# Patient Record
Sex: FEMALE | Race: WHITE | NOT HISPANIC OR LATINO | Employment: STUDENT | URBAN - METROPOLITAN AREA
[De-identification: names, ages, dates, MRNs, and addresses within clinical notes are randomized per-mention and may not be internally consistent; named-entity substitution may affect disease eponyms.]

---

## 2019-02-26 ENCOUNTER — COMMUNICATION - HEALTHEAST (OUTPATIENT)
Dept: TELEHEALTH | Facility: CLINIC | Age: 24
End: 2019-02-26

## 2019-02-26 ENCOUNTER — OFFICE VISIT - HEALTHEAST (OUTPATIENT)
Dept: FAMILY MEDICINE | Facility: CLINIC | Age: 24
End: 2019-02-26

## 2019-02-26 DIAGNOSIS — T78.40XA ALLERGIC REACTION, INITIAL ENCOUNTER: ICD-10-CM

## 2019-03-06 ENCOUNTER — OFFICE VISIT - HEALTHEAST (OUTPATIENT)
Dept: ALLERGY | Facility: CLINIC | Age: 24
End: 2019-03-06

## 2019-03-06 DIAGNOSIS — L50.8 CHRONIC URTICARIA: ICD-10-CM

## 2019-03-06 LAB
BASOPHILS # BLD AUTO: 0 THOU/UL (ref 0–0.2)
BASOPHILS NFR BLD AUTO: 0 % (ref 0–2)
EOSINOPHIL # BLD AUTO: 0.3 THOU/UL (ref 0–0.4)
EOSINOPHIL NFR BLD AUTO: 6 % (ref 0–6)
ERYTHROCYTE [DISTWIDTH] IN BLOOD BY AUTOMATED COUNT: 11.9 % (ref 11–14.5)
ERYTHROCYTE [SEDIMENTATION RATE] IN BLOOD BY WESTERGREN METHOD: 4 MM/HR (ref 0–20)
HCT VFR BLD AUTO: 41.8 % (ref 35–47)
HGB BLD-MCNC: 13.9 G/DL (ref 12–16)
LYMPHOCYTES # BLD AUTO: 1.7 THOU/UL (ref 0.8–4.4)
LYMPHOCYTES NFR BLD AUTO: 35 % (ref 20–40)
MCH RBC QN AUTO: 30.5 PG (ref 27–34)
MCHC RBC AUTO-ENTMCNC: 33.3 G/DL (ref 32–36)
MCV RBC AUTO: 92 FL (ref 80–100)
MONOCYTES # BLD AUTO: 0.4 THOU/UL (ref 0–0.9)
MONOCYTES NFR BLD AUTO: 7 % (ref 2–10)
NEUTROPHILS # BLD AUTO: 2.6 THOU/UL (ref 2–7.7)
NEUTROPHILS NFR BLD AUTO: 51 % (ref 50–70)
PLATELET # BLD AUTO: 401 THOU/UL (ref 140–440)
PMV BLD AUTO: 6.9 FL (ref 7–10)
RBC # BLD AUTO: 4.56 MILL/UL (ref 3.8–5.4)
TSH SERPL DL<=0.005 MIU/L-ACNC: 1.05 UIU/ML (ref 0.3–5)
WBC: 5 THOU/UL (ref 4–11)

## 2019-03-06 ASSESSMENT — MIFFLIN-ST. JEOR: SCORE: 1741.53

## 2019-03-07 LAB — ANA SER QL: 2.8 U

## 2019-03-11 ENCOUNTER — OFFICE VISIT - HEALTHEAST (OUTPATIENT)
Dept: INTERNAL MEDICINE | Facility: CLINIC | Age: 24
End: 2019-03-11

## 2019-03-11 DIAGNOSIS — J30.1 ALLERGIC RHINITIS DUE TO POLLEN, UNSPECIFIED SEASONALITY: ICD-10-CM

## 2019-03-11 DIAGNOSIS — G43.109 MIGRAINE WITH AURA AND WITHOUT STATUS MIGRAINOSUS, NOT INTRACTABLE: ICD-10-CM

## 2019-03-11 DIAGNOSIS — F41.1 GENERALIZED ANXIETY DISORDER: ICD-10-CM

## 2019-03-11 DIAGNOSIS — Z00.00 ENCOUNTER FOR PREVENTIVE HEALTH EXAMINATION: ICD-10-CM

## 2019-03-11 DIAGNOSIS — F33.1 MODERATE EPISODE OF RECURRENT MAJOR DEPRESSIVE DISORDER (H): ICD-10-CM

## 2019-03-11 DIAGNOSIS — M25.542 ARTHRALGIA OF HANDS, BILATERAL: ICD-10-CM

## 2019-03-11 DIAGNOSIS — M25.541 ARTHRALGIA OF HANDS, BILATERAL: ICD-10-CM

## 2019-03-11 LAB
ALBUMIN SERPL-MCNC: 4 G/DL (ref 3.5–5)
ALP SERPL-CCNC: 64 U/L (ref 45–120)
ALT SERPL W P-5'-P-CCNC: <9 U/L (ref 0–45)
ANION GAP SERPL CALCULATED.3IONS-SCNC: 7 MMOL/L (ref 5–18)
AST SERPL W P-5'-P-CCNC: 14 U/L (ref 0–40)
BILIRUB SERPL-MCNC: 0.6 MG/DL (ref 0–1)
BUN SERPL-MCNC: 10 MG/DL (ref 8–22)
C REACTIVE PROTEIN LHE: 0.2 MG/DL (ref 0–0.8)
CALCIUM SERPL-MCNC: 9.9 MG/DL (ref 8.5–10.5)
CHLORIDE BLD-SCNC: 108 MMOL/L (ref 98–107)
CO2 SERPL-SCNC: 26 MMOL/L (ref 22–31)
CREAT SERPL-MCNC: 0.69 MG/DL (ref 0.6–1.1)
GFR SERPL CREATININE-BSD FRML MDRD: >60 ML/MIN/1.73M2
GLUCOSE BLD-MCNC: 71 MG/DL (ref 70–125)
POTASSIUM BLD-SCNC: 4.4 MMOL/L (ref 3.5–5)
PROT SERPL-MCNC: 7.3 G/DL (ref 6–8)
SODIUM SERPL-SCNC: 141 MMOL/L (ref 136–145)
TRYPTASE SERPL-MCNC: 3.2 UG/L

## 2019-03-11 ASSESSMENT — MIFFLIN-ST. JEOR: SCORE: 1762.85

## 2019-03-12 ENCOUNTER — COMMUNICATION - HEALTHEAST (OUTPATIENT)
Dept: INTERNAL MEDICINE | Facility: CLINIC | Age: 24
End: 2019-03-12

## 2019-03-13 ENCOUNTER — COMMUNICATION - HEALTHEAST (OUTPATIENT)
Dept: ALLERGY | Facility: CLINIC | Age: 24
End: 2019-03-13

## 2019-03-15 ENCOUNTER — OFFICE VISIT - HEALTHEAST (OUTPATIENT)
Dept: MIDWIFE SERVICES | Facility: CLINIC | Age: 24
End: 2019-03-15

## 2019-03-15 ENCOUNTER — TRANSFERRED RECORDS (OUTPATIENT)
Dept: HEALTH INFORMATION MANAGEMENT | Facility: CLINIC | Age: 24
End: 2019-03-15

## 2019-03-15 DIAGNOSIS — Z30.46 NEXPLANON REMOVAL: ICD-10-CM

## 2019-03-15 ASSESSMENT — MIFFLIN-ST. JEOR: SCORE: 1762.4

## 2019-03-22 ENCOUNTER — RECORDS - HEALTHEAST (OUTPATIENT)
Dept: ADMINISTRATIVE | Facility: OTHER | Age: 24
End: 2019-03-22

## 2019-03-22 ENCOUNTER — HOSPITAL ENCOUNTER (INPATIENT)
Facility: CLINIC | Age: 24
LOS: 1 days | Discharge: HOME | End: 2019-03-23
Attending: PSYCHIATRY & NEUROLOGY | Admitting: PSYCHIATRY & NEUROLOGY
Payer: COMMERCIAL

## 2019-03-22 DIAGNOSIS — F31.64 BIPOLAR AFFECTIVE DISORDER, MIXED, SEVERE, WITH PSYCHOTIC BEHAVIOR (H): ICD-10-CM

## 2019-03-22 DIAGNOSIS — F12.10 MARIJUANA ABUSE: ICD-10-CM

## 2019-03-22 LAB
AMPHETAMINES UR QL SCN: NEGATIVE
BARBITURATES UR QL: NEGATIVE
BENZODIAZ UR QL: NEGATIVE
CANNABINOIDS UR QL SCN: POSITIVE
COCAINE UR QL: NEGATIVE
ETHANOL UR QL SCN: NEGATIVE
HCG UR QL: NEGATIVE
OPIATES UR QL SCN: NEGATIVE

## 2019-03-22 PROCEDURE — 81025 URINE PREGNANCY TEST: CPT | Performed by: EMERGENCY MEDICINE

## 2019-03-22 PROCEDURE — 90791 PSYCH DIAGNOSTIC EVALUATION: CPT

## 2019-03-22 PROCEDURE — 80320 DRUG SCREEN QUANTALCOHOLS: CPT | Performed by: EMERGENCY MEDICINE

## 2019-03-22 PROCEDURE — 99285 EMERGENCY DEPT VISIT HI MDM: CPT | Mod: Z6 | Performed by: PSYCHIATRY & NEUROLOGY

## 2019-03-22 PROCEDURE — 25000132 ZZH RX MED GY IP 250 OP 250 PS 637: Performed by: PSYCHIATRY & NEUROLOGY

## 2019-03-22 PROCEDURE — 99285 EMERGENCY DEPT VISIT HI MDM: CPT | Mod: 25 | Performed by: PSYCHIATRY & NEUROLOGY

## 2019-03-22 PROCEDURE — 80307 DRUG TEST PRSMV CHEM ANLYZR: CPT | Performed by: EMERGENCY MEDICINE

## 2019-03-22 RX ORDER — OLANZAPINE 10 MG/1
10 TABLET, ORALLY DISINTEGRATING ORAL ONCE
Status: COMPLETED | OUTPATIENT
Start: 2019-03-22 | End: 2019-03-22

## 2019-03-22 RX ORDER — MIRTAZAPINE 15 MG/1
7.5-15 TABLET, FILM COATED ORAL AT BEDTIME
Status: ON HOLD | COMMUNITY
End: 2019-03-23

## 2019-03-22 RX ADMIN — OLANZAPINE 10 MG: 10 TABLET, ORALLY DISINTEGRATING ORAL at 21:55

## 2019-03-22 ASSESSMENT — ENCOUNTER SYMPTOMS
ACTIVITY CHANGE: 1
NEUROLOGICAL NEGATIVE: 1
CARDIOVASCULAR NEGATIVE: 1
ENDOCRINE NEGATIVE: 1
EYES NEGATIVE: 1
HYPERACTIVE: 1
NERVOUS/ANXIOUS: 1
DECREASED CONCENTRATION: 1
SLEEP DISTURBANCE: 1
MUSCULOSKELETAL NEGATIVE: 1
HEMATOLOGIC/LYMPHATIC NEGATIVE: 1
RESPIRATORY NEGATIVE: 1
GASTROINTESTINAL NEGATIVE: 1

## 2019-03-22 NOTE — ED NOTES
Bed: HW02  Expected date: 3/22/19  Expected time: 5:14 PM  Means of arrival: Ambulance  Comments:   medic 14, 22yo female,  manic

## 2019-03-22 NOTE — PHARMACY-ADMISSION MEDICATION HISTORY
Admission medication history for the March 22, 2019 admission has been completed by pharmacy.     Interview sources:  patient, pharmacy fill records     Reliability of source: good     Medication compliance: generally good, not currently on medication    Preferred Outpatient Pharmacy: [facility] in [facility] ([phone])    Additional medication history information:   - Patient reports stopping her medications at the instruction of her provider ~2 weeks ago due to manic-like behavior:  Fluoxetine 20mg PO daily, prazosin 1mg PO HS, trazodone 50-100mg PO HS PRN sleep (meds were not added to her PTA med list)    - Prescribed mirtazapine for sleep but has not started yet     - Has been taking her friend's clonazepam (dose unknown) PO HS for sleep for the past 2 weeks     Prior to Admission Medication List:  Prior to Admission medications    Medication Sig Last Dose Taking? Auth Provider   mirtazapine (REMERON) 15 MG tablet Take 7.5-15 mg by mouth At Bedtime hasn't started Yes Unknown, Entered By History       Time spent: 15 minutes    Medication history completed by:   Crissy Luna, PharmD, [facility]  [facility]  Available daily from 1-9 PM: phone [phone], ascom *[phone], pager [phone]

## 2019-03-23 ENCOUNTER — RECORDS - HEALTHEAST (OUTPATIENT)
Dept: ADMINISTRATIVE | Facility: OTHER | Age: 24
End: 2019-03-23

## 2019-03-23 VITALS
SYSTOLIC BLOOD PRESSURE: 134 MMHG | HEIGHT: 67 IN | BODY MASS INDEX: 33.81 KG/M2 | RESPIRATION RATE: 18 BRPM | HEART RATE: 82 BPM | TEMPERATURE: 97.7 F | OXYGEN SATURATION: 99 % | DIASTOLIC BLOOD PRESSURE: 71 MMHG | WEIGHT: 215.39 LBS

## 2019-03-23 PROBLEM — F31.10 BIPOLAR I DISORDER WITH MANIA (H): Status: ACTIVE | Noted: 2019-03-23

## 2019-03-23 LAB
CHOLEST SERPL-MCNC: 158 MG/DL
HDLC SERPL-MCNC: 50 MG/DL
LDLC SERPL CALC-MCNC: 99 MG/DL
NONHDLC SERPL-MCNC: 108 MG/DL
TRIGL SERPL-MCNC: 44 MG/DL

## 2019-03-23 PROCEDURE — 36415 COLL VENOUS BLD VENIPUNCTURE: CPT | Performed by: PSYCHIATRY & NEUROLOGY

## 2019-03-23 PROCEDURE — 80061 LIPID PANEL: CPT | Performed by: PSYCHIATRY & NEUROLOGY

## 2019-03-23 PROCEDURE — 12400001 ZZH R&B MH UMMC

## 2019-03-23 PROCEDURE — 99235 HOSP IP/OBS SAME DATE MOD 70: CPT | Performed by: NURSE PRACTITIONER

## 2019-03-23 RX ORDER — LAMOTRIGINE 25 MG/1
25 TABLET ORAL DAILY
Qty: 42 TABLET | Refills: 0 | Status: SHIPPED | OUTPATIENT
Start: 2019-03-23 | End: 2023-11-01

## 2019-03-23 RX ORDER — OLANZAPINE 10 MG/1
10 TABLET ORAL AT BEDTIME
Status: DISCONTINUED | OUTPATIENT
Start: 2019-03-23 | End: 2019-03-23 | Stop reason: HOSPADM

## 2019-03-23 RX ORDER — ALUMINA, MAGNESIA, AND SIMETHICONE 2400; 2400; 240 MG/30ML; MG/30ML; MG/30ML
30 SUSPENSION ORAL EVERY 4 HOURS PRN
Status: DISCONTINUED | OUTPATIENT
Start: 2019-03-23 | End: 2019-03-23 | Stop reason: HOSPADM

## 2019-03-23 RX ORDER — BISACODYL 10 MG
10 SUPPOSITORY, RECTAL RECTAL DAILY PRN
Status: DISCONTINUED | OUTPATIENT
Start: 2019-03-23 | End: 2019-03-23 | Stop reason: HOSPADM

## 2019-03-23 RX ORDER — OLANZAPINE 10 MG/1
10 TABLET ORAL AT BEDTIME
Qty: 60 TABLET | Refills: 1 | Status: SHIPPED | OUTPATIENT
Start: 2019-03-23 | End: 2023-11-01

## 2019-03-23 RX ORDER — HYDROXYZINE HYDROCHLORIDE 25 MG/1
25 TABLET, FILM COATED ORAL EVERY 4 HOURS PRN
Status: DISCONTINUED | OUTPATIENT
Start: 2019-03-23 | End: 2019-03-23 | Stop reason: HOSPADM

## 2019-03-23 RX ORDER — ACETAMINOPHEN 325 MG/1
650 TABLET ORAL EVERY 4 HOURS PRN
Status: DISCONTINUED | OUTPATIENT
Start: 2019-03-23 | End: 2019-03-23 | Stop reason: HOSPADM

## 2019-03-23 RX ORDER — TRAZODONE HYDROCHLORIDE 50 MG/1
50 TABLET, FILM COATED ORAL
Status: DISCONTINUED | OUTPATIENT
Start: 2019-03-23 | End: 2019-03-23 | Stop reason: HOSPADM

## 2019-03-23 RX ORDER — OLANZAPINE 10 MG/1
10 TABLET ORAL
Status: DISCONTINUED | OUTPATIENT
Start: 2019-03-23 | End: 2019-03-23 | Stop reason: HOSPADM

## 2019-03-23 RX ORDER — OLANZAPINE 10 MG/2ML
10 INJECTION, POWDER, FOR SOLUTION INTRAMUSCULAR
Status: DISCONTINUED | OUTPATIENT
Start: 2019-03-23 | End: 2019-03-23 | Stop reason: HOSPADM

## 2019-03-23 ASSESSMENT — ACTIVITIES OF DAILY LIVING (ADL)
SWALLOWING: 0-->SWALLOWS FOODS/LIQUIDS WITHOUT DIFFICULTY
BATHING: 0-->INDEPENDENT
RETIRED_EATING: 0-->INDEPENDENT
TOILETING: 0-->INDEPENDENT
RETIRED_COMMUNICATION: 0-->UNDERSTANDS/COMMUNICATES WITHOUT DIFFICULTY
DRESS: 0-->INDEPENDENT
AMBULATION: 0-->INDEPENDENT
COGNITION: 0 - NO COGNITION ISSUES REPORTED
FALL_HISTORY_WITHIN_LAST_SIX_MONTHS: NO
TRANSFERRING: 0-->INDEPENDENT

## 2019-03-23 ASSESSMENT — MIFFLIN-ST. JEOR: SCORE: 1769.63

## 2019-03-23 NOTE — PROVIDER NOTIFICATION
03/23/19 0713   Patient Belongings   Did you bring any home meds/supplements to the hospital?  No   Patient Belongings Put in Hospital Secure Location (Security or Locker, etc.) cell phone/electronics;clothing;shoes;wallet   Belongings Search Yes   Clothing Search Yes     Pt. Belonging in locker:   black sweater, blue+yellow colored leggings, pair of socks, 2 vape kits, , phone, shoes, blue cross blue shied card, Passport card.    Pt. Belongings in security: Wallet-- [facility] check: [phone], care credit: [phone], Amazon prime: 7360, Passport, [employer] bank check: [id number], starbucks: [phone], go to card [id number]    A               Admission:  I am responsible for any personal items that are not sent to the safe or pharmacy.  [facility] is not responsible for loss, theft or damage of any property in my possession.    Signature:  _________________________________ Date: _______  Time: _____                                              Staff Signature:  ____________________________ Date: ________  Time: _____      2nd Staff person, if patient is unable/unwilling to sign:    Signature: ________________________________ Date: ________  Time: _____     Discharge:  [facility] has returned all of my personal belongings:    Signature: _________________________________ Date: ________  Time: _____                                          Staff Signature:  ____________________________ Date: ________  Time: _____
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
## 2019-03-23 NOTE — ED PROVIDER NOTES
History     Chief Complaint   Patient presents with     Manic Behavior     has had insomnia for 4 days--no sleep; had a reaction to her psych meds and is afraid to take any; was given Rx for Mirtazapine but hasn't started it; chronic SI thoughts but has not acted on them in 4 years; denies Si plan; denies HI     Suicidal     chronic thoughts but has not acted on them in 4 years; denies any plan at this time     HPI  Kassandra Arcos is a 23 year old female who is here referred by her therapist due to concerns that she is in a manic or mixed state. Patient has not been sleeping past 4 days. She has had sleep disruptions for couple weeks. Patient is up cleaning all night and has periods of elevated mood, energy and racing thoughts. She also gets impulsive and goes on spending sprees.Patient has history of PTSD and had been treated with prozac and trazodone. She felt too tired on trazodone. Prozac was switched to prazosin. Patient reports getting hives and an allergic reaction. She did not know if it was due to prazosin or prozac. She was prescribed Remeron but decided to stop her antidepressants. She has been smoking THC. She sleeps with 2 partners (male and female) for comfort and companionship. Patient has chronic SI but feels that her mood and emotion is too dysregulated and agreed to come here and be admitted as she feels impulsive and unable to care for self.    Please see DEC Crisis Assessment on 3/22/19 in Epic for further details.    PERSONAL MEDICAL HISTORY  Past Medical History:   Diagnosis Date     Anxiety      Bipolar I disorder (H)      PTSD (post-traumatic stress disorder)      PAST SURGICAL HISTORY  Past Surgical History:   Procedure Laterality Date     ENT SURGERY       FAMILY HISTORY  History reviewed. No pertinent family history.  SOCIAL HISTORY  Social History     Tobacco Use     Smoking status: Former Smoker     Smokeless tobacco: Never Used     Tobacco comment: Juul user currently   Substance Use  Topics     Alcohol use: Yes     Comment: rare; unknown last use     MEDICATIONS  No current facility-administered medications for this encounter.      Current Outpatient Medications   Medication     mirtazapine (REMERON) 15 MG tablet     ALLERGIES  Allergies   Allergen Reactions     Fluoxetine      Hives, excessive mucous production, fever, migraine     Prazosin      difficulty breathing, airway swelling, hives     Sulfa Drugs      Hives, swelling         I have reviewed the Medications, Allergies, Past Medical and Surgical History, and Social History in the Epic system.    Review of Systems   Constitutional: Positive for activity change.   HENT: Negative.    Eyes: Negative.    Respiratory: Negative.    Cardiovascular: Negative.    Gastrointestinal: Negative.    Endocrine: Negative.    Genitourinary: Negative.    Musculoskeletal: Negative.    Skin: Negative.    Neurological: Negative.    Hematological: Negative.    Psychiatric/Behavioral: Positive for decreased concentration, sleep disturbance and suicidal ideas. The patient is nervous/anxious and is hyperactive.    All other systems reviewed and are negative.      Physical Exam   BP: 126/68  Pulse: 83  Temp: 99.6  F (37.6  C)  Resp: 16  SpO2: 98 %      Physical Exam   Constitutional: She appears well-developed and well-nourished.   HENT:   Head: Normocephalic.   Eyes: Pupils are equal, round, and reactive to light.   Neck: Normal range of motion.   Cardiovascular: Normal rate.   Pulmonary/Chest: Effort normal.   Abdominal: Soft.   Musculoskeletal: Normal range of motion.   Neurological: She is alert.   Skin: Skin is warm.   Psychiatric: Her behavior is normal. Her mood appears anxious. Her affect is inappropriate. Her speech is rapid and/or pressured and tangential. She is not agitated, not aggressive, not hyperactive and not combative. Thought content is paranoid. She expresses impulsivity. She is inattentive.       ED Course        Procedures             Labs  Ordered and Resulted from Time of ED Arrival Up to the Time of Departure from the ED   DRUG ABUSE SCREEN 6 CHEM DEP URINE ([facility]) - Abnormal; Notable for the following components:       Result Value    Cannabinoids Qual Urine Positive (*)     All other components within normal limits   HCG QUALITATIVE URINE            Assessments & Plan (with Medical Decision Making)   Patient with emotional dysregulation which appears consistent with bryan or mixed episode. She has had poor sleep. She is unable to manage herself. She is not medicated presently and would benefit from admission for stabilization, as she may have been activated by her antidepressants or had an allergic reaction. Patient is voluntary.    I have reviewed the nursing notes.    I have reviewed the findings, diagnosis, plan and need for follow up with the patient.       Medication List      There are no discharge medications for this visit.         Final diagnoses:   Bipolar affective disorder, mixed, severe, with psychotic behavior (H)   Marijuana abuse       3/22/2019   [facility], [place], EMERGENCY DEPARTMENT     Gurpreet Machuca MD  03/22/19 5459

## 2019-03-23 NOTE — DISCHARGE INSTRUCTIONS
Behavioral Discharge Planning and Instructions      Summary:  You were admitted on 3/22/2019  due to Manic Symptomology.  You were treated by Dr. Jefry Avalos MD and discharged  from Station 4A to Home      Principal Diagnosis: Derrick FRIEND   Bipolar Disorder I, currrent episode manic, severe, with psychotic features      Health Care Follow-up Appointments:   Medication Management: Chao Morfin PA-C @ [facility]   Phone: [phone]   Address: [address] [place] [address]  Next appointment is 4/3/19      Therapist:  Mallory Encarnacion @ [facility] (weekly for about 1 year)   Phone: [phone] Address: [address] [place] [address]   You have regular appointments with this provider and your next appointment is in place    PCP [facility], Address: [address] [place] [address]   Phone: [phone]   Attend all scheduled appointments with your outpatient providers. Call at least 24 hours in advance if you need to reschedule an appointment to ensure continued access to your outpatient providers.   Major Treatments, Procedures and Findings:  You were provided with: a psychiatric assessment, assessed for medical stability, medication evaluation and/or management, group therapy and milieu management    Symptoms to Report: feeling more aggressive, increased confusion, losing more sleep, mood getting worse or thoughts of suicide    Early warning signs can include: increased depression or anxiety sleep disturbances increased thoughts or behaviors of suicide or self-harm  increased unusual thinking, such as paranoia or hearing voices    Safety and Wellness:  Take all medicines as directed.  Make no changes unless your doctor suggests them.      Follow treatment recommendations.  Refrain from alcohol and non-prescribed drugs.  Ask your support system to help you reduce your access to items that could harm yourself or others. If there is a  "concern for safety, call 911.    Resources:   Crisis Intervention: 406.865.8961 or 753-815-1369 (TTY: 929.959.4929).  Call anytime for help.  National [place] on Mental Illness (www.mn.tiffany.org): 543.999.1030 or 861-066-5583.  Suicide Awareness Voices of Education (SAVE) (www.save.org): 153-357-OLSP (0114)  National Suicide Prevention Line (www.mentalhealthmn.org): 620-778-FKIO (3077)  Mental Health Consumer/Survivor Network of [place] (www.mhcsn.net): 343.939.9412 or 615-769-4180  Mental Health Association of [place] (www.mentalhealth.org): 939.478.9380 or 116-112-0480  Self- Management and Recovery Training., [employer]-- Toll free: 188.343.3916  www.AppUpper - ASO.org  [facility] Crisis Response - Adult 887 416-4168  Text 4 Life: txt \"LIFE\" to 22197 for immediate support and crisis intervention  Crisis text line: Text \"[place]\" to 617091. Free, confidential, 24/7.  Crisis Intervention: 215.609.9736 or 093-577-1542. Call anytime for help.       The treatment team has appreciated the opportunity to work with you.     Kassandra,  please take care and make your recovery a daily recovery.       "
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
## 2019-03-23 NOTE — ED NOTES
ED to Behavioral Floor Handoff    SITUATION  Kassandra Arcos is a 23 year old female who speaks English and lives in a home with others The patient arrived in the ED by ambulance from clinic with a complaint of Manic Behavior (has had insomnia for 4 days--no sleep; had a reaction to her psych meds and is afraid to take any; was given Rx for Mirtazapine but hasn't started it; chronic SI thoughts but has not acted on them in 4 years; denies Si plan; denies HI) and Suicidal (chronic thoughts but has not acted on them in 4 years; denies any plan at this time)  .The patient's current symptoms started/worsened 4 year(s) ago and during this time the symptoms have remained the same.   In the ED, pt was diagnosed with   Final diagnoses:   Bipolar affective disorder, mixed, severe, with psychotic behavior (H)   Marijuana abuse        Initial vitals were: BP: 126/68  Pulse: 83  Temp: 99.6  F (37.6  C)  Resp: 16  SpO2: 98 %   --------  Is the patient diabetic? No   If yes, last blood glucose? --     If yes, was this treated in the ED? --  --------  Is the patient inebriated (ETOH) No or Impaired on other substances? No  MSSA done? No  Last MSSA score: --    Were withdrawal symptoms treated? No  Does the patient have a seizure history? No. If yes, date of most recent seizure--  --------  Is the patient patient experiencing suicidal ideation? reports suicidal ideation with out intention or a suicidal plan    Homicidal ideation? denies current or recent homicidal ideation or behaviors.    Self-injurious behavior/urges? denies current or recent self injurious behavior or ideation.  ------  Was pt aggressive in the ED No  Was a code called No  Is the pt now cooperative? Yes  -------  Meds given in ED: Medications - No data to display   Family present during ED course? No  Family currently present? No    BACKGROUND  Does the patient have a cognitive impairment or developmental disability? No  Allergies:   Allergies   Allergen Reactions      Fluoxetine      Hives, excessive mucous production, fever, migraine     Prazosin      difficulty breathing, airway swelling, hives     Sulfa Drugs      Hives, swelling   .   Social demographics are   Social History     Socioeconomic History     Marital status: Single     Spouse name: None     Number of children: None     Years of education: None     Highest education level: None   Occupational History     None   Social Needs     Financial resource strain: None     Food insecurity:     Worry: None     Inability: None     Transportation needs:     Medical: None     Non-medical: None   Tobacco Use     Smoking status: Former Smoker     Smokeless tobacco: Never Used     Tobacco comment: Juul user currently   Substance and Sexual Activity     Alcohol use: Yes     Comment: rare; unknown last use     Drug use: Yes     Types: Marijuana     Comment: last used yesterday     Sexual activity: None   Lifestyle     Physical activity:     Days per week: None     Minutes per session: None     Stress: None   Relationships     Social connections:     Talks on phone: None     Gets together: None     Attends Anabaptism service: None     Active member of club or organization: None     Attends meetings of clubs or organizations: None     Relationship status: None     Intimate partner violence:     Fear of current or ex partner: None     Emotionally abused: None     Physically abused: None     Forced sexual activity: None   Other Topics Concern     None   Social History Narrative     None        ASSESSMENT  Labs results   Labs Ordered and Resulted from Time of ED Arrival Up to the Time of Departure from the ED   DRUG ABUSE SCREEN 6 CHEM DEP URINE ([facility]) - Abnormal; Notable for the following components:       Result Value    Cannabinoids Qual Urine Positive (*)     All other components within normal limits   HCG QUALITATIVE URINE      Imaging Studies: No results found for this or any previous visit (from the past 24 hour(s)).   Most  recent vital signs /68   Pulse 83   Temp 99.6  F (37.6  C) (Oral)   Resp 16   LMP 03/20/2019 (Exact Date)   SpO2 98%    Abnormal labs/tests/findings requiring intervention:---   Pain control: pt had none  Nausea control: pt had none    RECOMMENDATION  Are any infection precautions needed (MRSA, VRE, etc.)? No If yes, what infection? --  ---  Does the patient have mobility issues? independently. If yes, what device does the pt use? ---  ---  Is patient on 72 hour hold or commitment? No If on 72 hour hold, have hold and rights been given to patient? No  Are admitting orders written if after 10 p.m. ?No  Tasks needing to be completed:---     Dian Don   [facility]--    [phone] [place] [facility]   [phone] [facility]

## 2019-03-23 NOTE — ED NOTES
Patient started to cry and was hysterical when her friends came into her room to join her. Patient reports feeling claustrophobic and claims that she hates being contained in a small room. MD updated and patient was given 10 mg of oral Zyprexa.

## 2019-03-23 NOTE — PROGRESS NOTES
Re: Case Management note    Patient was asleep this am and having arrived on the unit just a few hours earlier, writer consulting with RN and it was decided to not wake her for assessment.  The patient had signed a 12 hour intent prior to falling asleep (which expires this afternoon).  In the event that she decides to leave the AVS was started.

## 2019-03-23 NOTE — PROGRESS NOTES
DISCHARGE:  This RN and pt have reviewed all meds and aftercare plan. All belongings returned. Pt denies SI , anxiety or depression at this time. Pt bright and smiling upon DC.
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
## 2019-04-11 ENCOUNTER — COMMUNICATION - HEALTHEAST (OUTPATIENT)
Dept: INTERNAL MEDICINE | Facility: CLINIC | Age: 24
End: 2019-04-11

## 2019-04-19 ENCOUNTER — HEALTH MAINTENANCE LETTER (OUTPATIENT)
Age: 24
End: 2019-04-19

## 2019-04-24 ENCOUNTER — OFFICE VISIT - HEALTHEAST (OUTPATIENT)
Dept: RHEUMATOLOGY | Facility: CLINIC | Age: 24
End: 2019-04-24

## 2019-04-24 DIAGNOSIS — M25.50 HYPERMOBILITY ARTHRALGIA: ICD-10-CM

## 2019-04-24 DIAGNOSIS — G89.29 CHRONIC BILATERAL LOW BACK PAIN WITHOUT SCIATICA: ICD-10-CM

## 2019-04-24 DIAGNOSIS — M54.50 CHRONIC BILATERAL LOW BACK PAIN WITHOUT SCIATICA: ICD-10-CM

## 2019-04-24 ASSESSMENT — MIFFLIN-ST. JEOR: SCORE: 1762.4

## 2019-07-19 ENCOUNTER — COMMUNICATION - HEALTHEAST (OUTPATIENT)
Dept: INTERNAL MEDICINE | Facility: CLINIC | Age: 24
End: 2019-07-19

## 2019-08-16 ENCOUNTER — COMMUNICATION - HEALTHEAST (OUTPATIENT)
Dept: INTERNAL MEDICINE | Facility: CLINIC | Age: 24
End: 2019-08-16

## 2019-10-09 ENCOUNTER — COMMUNICATION - HEALTHEAST (OUTPATIENT)
Dept: INTERNAL MEDICINE | Facility: CLINIC | Age: 24
End: 2019-10-09

## 2019-11-19 ENCOUNTER — COMMUNICATION - HEALTHEAST (OUTPATIENT)
Dept: INTERNAL MEDICINE | Facility: CLINIC | Age: 24
End: 2019-11-19

## 2020-03-11 ENCOUNTER — HEALTH MAINTENANCE LETTER (OUTPATIENT)
Age: 25
End: 2020-03-11

## 2021-01-03 ENCOUNTER — HEALTH MAINTENANCE LETTER (OUTPATIENT)
Age: 26
End: 2021-01-03

## 2021-04-25 ENCOUNTER — HEALTH MAINTENANCE LETTER (OUTPATIENT)
Age: 26
End: 2021-04-25

## 2021-05-28 NOTE — PROGRESS NOTES
ASSESSMENT AND PLAN:  Kassandra Arcos 23 y.o. female is seen here on 04/24/19 for evaluation of polyarthralgias and back pain of nearly 7 years duration.  She has hypermobility.  This is outlined to her.  Given her age, and relative limited family history availability 1 of the key issues to consider would be exclusion of inflammatory joint disease such as spondylitis.  One option for her is to consider sacroiliac joint x-rays.  The pros and cons were outlined.  She is does not think she is pregnant.  Meanwhile arranging for her to be seen in physical therapy/Occupational Therapy.  Once these data are available further course to be charted accordingly.  Diagnoses and all orders for this visit:    Hypermobility arthralgia  -     Ambulatory referral to PT/OT  -     XR Sacroliac Joints 3 Or More VWS; Future; Expected date: 04/24/2019  -     XR Sacroliac Joints 3 Or More VWS    Chronic bilateral low back pain without sciatica  -     Ambulatory referral to PT/OT  -     XR Sacroliac Joints 3 Or More VWS; Future; Expected date: 04/24/2019  -     XR Sacroliac Joints 3 Or More VWS      HISTORY OF PRESENTING ILLNESS:  Kassandra Arcos, 23 y.o., female is here for evaluation of joint pains.  She reports that the symptoms have gone on for the past several years, she remembers this was the case when she was a sophomore in high school nearly 7 years ago.  She reports that the pain is in her hands.  This typically is worse on the right side she is right-hand dominant he is points to the palmar aspect of the thenar eminence to describe the area of pain.  Because of this he has had to ask for special accommodations at school.  She has noted pain elsewhere to such as across her neck back thoracic region.  She noted the pain to be ranging from mild to moderately severe stiffness in the morning often lasting about an hour, overall pain level noted to be 6.5/10 interfering with some of the day-to-day activities.  She has not had pain in the  back radiate down her lower extremities.  She is woken up from sleep almost every 45 minutes.  She does not recall the last time she had a full night sleep.  She has taken a variety of over-the-counter measures with variable benefit.  She reports no psoriasis herself, and the family or ulcerative colitis or Crohn's disease.  However she noted that her information about her family's health is and not complete.  She has noted blurry vision and ringing in the.  She has had abdominal discomfort.  She has a history of hives and sun sensitivity.  She has headaches and dizziness.  She describes anxiety depression agitation and carries a diagnosis of bipolar disorder for which she is on Lamictal.  She is not a smoker alcohol infrequently.  She goes to [employer] for a double major in theater and social justice.  She has noted herself otherwise to be in good health.    Further historical information and ADL limitations as noted in the multidimensional health assessment questionnaire attached in the EMR. Rest of the 13 system ROS is negative.     ALLERGIES:Benadryl [diphenhydramine hcl]; Prazosin; Prozac [fluoxetine]; and Sulfa (sulfonamide antibiotics)    PAST MEDICAL/ACTIVE PROBLEMS/MEDICATION/ FAMILY HISTORY/SOCIAL DATA:  The patient has a family history of  Past Medical History:   Diagnosis Date     Anxiety      Chronic hand pain      Depression      Hearing loss      Migraine     opthalmic mostly     Varicella      Social History     Tobacco Use   Smoking Status Never Smoker   Smokeless Tobacco Never Used     Patient Active Problem List   Diagnosis     Recurrent major depressive disorder (H)     Generalized anxiety disorder     Allergic rhinitis due to pollen     Current Outpatient Medications   Medication Sig Dispense Refill     cloNIDine HCl (CATAPRES) 0.1 MG tablet Take 0.1 mg by mouth at bedtime.  0     lamoTRIgine (LAMICTAL) 25 MG tablet Take 25 mg by mouth 2 (two) times a day.       No current  "facility-administered medications for this visit.        COMPREHENSIVE EXAMINATION:  Vitals:    04/24/19 1143   BP: 104/68   Patient Site: Right Arm   Patient Position: Sitting   Cuff Size: Adult Large   Pulse: 88   Weight: 216 lb (98 kg)   Height: 5' 7\" (1.702 m)     A well appearing alert oriented female. Vital data as noted above. Her eyes without inflammation/scleromalacia. ENTwithout oral mucositis, thrush, nasal deformity, external ear redness, deformity. Her neck is without lymphadenopathy and supple. Lungs normal sounds, no pleural rub. Heart auscultation normal rate, rhythm; no pericardial rub and murmurs. Abdomen soft, non tender, no organomegaly. Skin examined for heliotrope, malar area eruption, lupus pernio, periungual erythema, sclerodactyly, papules, erythema nodosum, purpura, nail pitting, onycholysis, and obvious psoriasis lesion. Neurological examination shows normal alertness, speech, facial symmetry, tone and power in upper and lower extremities. The joint examination is performed for swelling, tenderness, warmth, erythema, and range of motion in the following joints: DIPs, PIPs, MCPs, wrists, first CMC's, elbows, shoulders, hips, knees, ankles, feet; spine for range of motion and paraspinal muscles for tenderness. The salient  findings are: There is no synovitis in any of the palpable joints.  She does not have dactylitis.  There is no enthesitis.  She has 0 occiput to wall distance.  She is able to place her palms flat on the floor with lumbar spine flexion.  She has mild hyperextension sometimes the digits more than 90 degrees.  She has hyperextension at the elbows to about 10 to 15 degrees.  She has several tattoos.  She does not have stomatitis, pleuropericardial rub.  No features of erythema nodosum.  She has mild tenderness across the trapezius along the paraspinal region and lumbosacral area the lateral more so than the earlier.    LAB / IMAGING DATA:  ALT   Date Value Ref Range Status "   03/11/2019 <9 0 - 45 U/L Final     Albumin   Date Value Ref Range Status   03/11/2019 4.0 3.5 - 5.0 g/dL Final     Creatinine   Date Value Ref Range Status   03/11/2019 0.69 0.60 - 1.10 mg/dL Final       WBC   Date Value Ref Range Status   03/06/2019 5.0 4.0 - 11.0 thou/uL Final     Hemoglobin   Date Value Ref Range Status   03/06/2019 13.9 12.0 - 16.0 g/dL Final     Platelets   Date Value Ref Range Status   03/06/2019 401 140 - 440 thou/uL Final       Lab Results   Component Value Date    SEDRATE 4 03/06/2019

## 2021-06-02 VITALS — BODY MASS INDEX: 34.54 KG/M2 | HEIGHT: 66 IN | WEIGHT: 214.9 LBS

## 2021-06-02 VITALS — HEIGHT: 67 IN | BODY MASS INDEX: 33.9 KG/M2 | WEIGHT: 216 LBS

## 2021-06-02 VITALS — BODY MASS INDEX: 33.9 KG/M2 | HEIGHT: 67 IN | WEIGHT: 216 LBS

## 2021-06-02 VITALS — BODY MASS INDEX: 33.92 KG/M2 | HEIGHT: 67 IN | WEIGHT: 216.1 LBS

## 2021-06-02 VITALS — WEIGHT: 214.9 LBS

## 2021-06-18 NOTE — LETTER
Letter by Alon Mcgergor MD at      Author: Alon Mcgregor MD Service: -- Author Type: --    Filed:  Encounter Date: 3/13/2019 Status: (Other)       Kassandra Arcos  [address] [place] [address]             March 13, 2019         Dear Ms. Arcos,    Below are the results from your recent visit:    Resulted Orders   Antinuclear Antibody (MARIE) Cascade   Result Value Ref Range    MARIE Screen Cascade 2.8 <=2.9 U    Narrative    <1.0 negative  1.1-2.9 weakly positive  3.0-5.9 positive ( reflex)  > or=6.0 strongly positive   Erythrocyte Sedimentation Rate   Result Value Ref Range    Sed Rate 4 0 - 20 mm/hr   Tryptase   Result Value Ref Range    Tryptase 3.2 <11.0 ug/L      Comment:      PERFORMED AT  [address] [address]    Thyroid Stimulating Hormone (TSH)   Result Value Ref Range    TSH 1.05 0.30 - 5.00 uIU/mL   HM1 (CBC with Diff)   Result Value Ref Range    WBC 5.0 4.0 - 11.0 thou/uL    RBC 4.56 3.80 - 5.40 mill/uL    Hemoglobin 13.9 12.0 - 16.0 g/dL    Hematocrit 41.8 35.0 - 47.0 %    MCV 92 80 - 100 fL    MCH 30.5 27.0 - 34.0 pg    MCHC 33.3 32.0 - 36.0 g/dL    RDW 11.9 11.0 - 14.5 %    Platelets 401 140 - 440 thou/uL    MPV 6.9 (L) 7.0 - 10.0 fL    Neutrophils % 51 50 - 70 %    Lymphocytes % 35 20 - 40 %    Monocytes % 7 2 - 10 %    Eosinophils % 6 0 - 6 %    Basophils % 0 0 - 2 %    Neutrophils Absolute 2.6 2.0 - 7.7 thou/uL    Lymphocytes Absolute 1.7 0.8 - 4.4 thou/uL    Monocytes Absolute 0.4 0.0 - 0.9 thou/uL    Eosinophils Absolute 0.3 0.0 - 0.4 thou/uL    Basophils Absolute 0.0 0.0 - 0.2 thou/uL     Your labs look great.    Please call with questions or contact us using [facility].    Sincerely,        Electronically signed by Alon Mcgregor MD

## 2021-06-24 NOTE — PROGRESS NOTES
Chief Complaint   Patient presents with     Allergic Reaction     hives and reactions to possible meds- patient states she was instructed to retake meds that psychologist told her to and had multiple concerns last night after restarting the meds        HPI:  Kassandra Arcos is a 23 y.o. female who presents today complaining of concern for possible allergic reaction to medication. She has been on Prozac for about 3 months. She stopped taking it for about 1 week. Hives, wheezing, dizzy, sweating, feverish, confused, itchy, diarrhea, vomiting, and low back and abdominal cramping. It had tried She hadn't tried any foods. She took 2 Singulair and her symptoms have seemed to resolve. She currently has itching ears, neck itching, and lightheaded and shaky. She does not feel that Prozac is significantly helpful for her psych issues. She denies HI/SI with or without meds.     History obtained from the patient.    Problem List:  There are no relevant problems documented for this patient.      No past medical history on file.    Social History     Tobacco Use     Smoking status: Never Smoker     Smokeless tobacco: Never Used   Substance Use Topics     Alcohol use: Not on file       Review of Systems   HENT: Negative for ear pain (itching and swollen sensation) and trouble swallowing.    Respiratory: Positive for shortness of breath.    Gastrointestinal: Positive for abdominal pain (cramping), nausea and vomiting.   Musculoskeletal: Positive for back pain (cramping).   Skin: Positive for rash.   Neurological: Positive for light-headedness.       Vitals:    02/26/19 1156   BP: 130/76   Pulse: 71   Resp: 14   Temp: 98.6  F (37  C)   TempSrc: Oral   SpO2: 97%   Weight: 214 lb 14.4 oz (97.5 kg)       Physical Exam   Constitutional: She appears well-developed and well-nourished. No distress.   HENT:   Head: Normocephalic and atraumatic.   Right Ear: External ear and ear canal normal. Tympanic membrane is scarred.   Left Ear: External  ear and ear canal normal. Tympanic membrane is scarred.   Mouth/Throat: Uvula is midline and oropharynx is clear and moist. No oral lesions. No oropharyngeal exudate, posterior oropharyngeal edema or posterior oropharyngeal erythema.   Eyes: Conjunctivae are normal. Right eye exhibits no discharge. Left eye exhibits no discharge.   Cardiovascular: Normal rate, regular rhythm and normal heart sounds.   Pulmonary/Chest: Effort normal and breath sounds normal. No respiratory distress.   Skin: She is not diaphoretic.   Mild hives like rash present posterior to ears bilaterally worse on the R.    Psychiatric: She has a normal mood and affect. Her behavior is normal. Judgment and thought content normal.       Clinical Decision Making:  Physical exam is currently normal. No signs of wheezing, lip or tongue swelling. Mild signs of hives. Patient was referred to allergy specialist. Schedule to establish care with a primary, and she was given Zyrtec and Famotidine for treatment of allergic reaction.  At the end of the encounter, I discussed results, diagnosis, medications. Discussed red flags for immediate return to clinic/ER, as well as indications for follow up if no improvement. Patient understood and agreed to plan. Patient was stable for discharge.    1. Allergic reaction, initial encounter  Ambulatory referral to Allergy    cetirizine (ZYRTEC) 10 MG tablet    famotidine (PEPCID) 20 MG tablet         Patient Instructions   1. Begin taking antihistamine medications according to bottle instructions.   2. Seek emergency medical attention if you develop difficulty breathing, lip/tongue swelling, or fainting.   3. Discontinue Prozac for right now.

## 2021-06-24 NOTE — PROGRESS NOTES
ASSESSMENT/PLAN:    1. Moderate episode of recurrent major depressive disorder (H)  She has psychiatric treatment ongoing.  Reaction to prozac may not have been true allergy, see allergy evaluation.     2. Generalized anxiety disorder  Ongoing, on mirtazipine, and has psychiatric care.     3. Allergic rhinitis   Will take OTC anti-histamine.     4. Encounter for preventive health examination  Her history and exam reveal need for referral for GYN, and contraception.  Will get CMP.    - Ambulatory referral to Gynecology  - Comprehensive metabolic panel    5. Migraine with aura and without status migrainosus, not intractable  She describes predominantly ophthalmic migraine symptoms.  Has seen eye MD who does not find her symptoms are ocular.  There is periodic headache that may be migraine, or tension with vascular elements.  Will refer to neurology.  Neurologic exam today grossly negative.   - Ambulatory referral to Neurology    6. Arthralgia of hands, bilateral  Chronic arthralgias of small joints of the hands, with some hypermobility. No actual redness, or swelling, or indication of active synovitis.  She reports that her mother and brother have similar symptoms.  Recent MARIE, and ESR and CBC were normal.  Will get RF and refer to rheumatology.   - C-reactive protein  - Ambulatory referral to Rheumatology    CHIEF COMPLAINT:  Chief Complaint   Patient presents with     Establish Care     Annual Exam     Chronic pain/Arthritis in bilateral hands, migraines causing vision changes, skin issues     Contraception     Patient requesting to have Nexplanon removed     HISTORY OF PRESENT ILLNESS:  Kassandra is a 23 y.o. female presenting to the clinic today presents with concerns about chronic hand arthralgias, recent reaction to prozac, ongoing treatment for depression, chronic intermittent headaches with visual ophthalmic migraine-like 'TV static' scotomata.  She has seen her eye physician and does wear glasses.  Not much  exercise.  Is senior at [employer], and from [place].  No recent fever, or unusual cough, or unusual dyspnea, or chest or abdominal pain.  Would like norplant removed and consider alternative birth control. Depression symptoms are mostly improved, no self harm ideation.     REVIEW OF SYSTEMS:   Constitutional: no fever, chills, or sweats  Respiratory: No wheezes, cough, shortness of breath  Cardiovascular: No chest pain or palpitations  Gastrointestinal: No nausea, vomiting, diarrhea, dyspepsia, or pain  Genitourinary: No urgency, frequency, or dysuria  Musculoskeletal: see HPI  Neurological: see HPI  Psychiatric: see HPI   Allergic/Immunologic: see HPI  All other systems on reveiw are negative.    PFSH:    Social History     Tobacco Use   Smoking Status Never Smoker   Smokeless Tobacco Never Used     Family History   Problem Relation Age of Onset     Breast cancer Mother         BRAC 1 and 2 negative     Migraines Mother      Anxiety disorder Mother      Depression Mother      Tremor Mother      Other Father         OCD     Tremor Maternal Grandmother      Lung cancer Paternal Grandfather      Social History     Socioeconomic History     Marital status: Single     Spouse name: Not on file     Number of children: Not on file     Years of education: Not on file     Highest education level: Not on file   Occupational History     Not on file   Social Needs     Financial resource strain: Not on file     Food insecurity:     Worry: Not on file     Inability: Not on file     Transportation needs:     Medical: Not on file     Non-medical: Not on file   Tobacco Use     Smoking status: Never Smoker     Smokeless tobacco: Never Used   Substance and Sexual Activity     Alcohol use: Not on file     Drug use: Not on file     Sexual activity: Not on file   Lifestyle     Physical activity:     Days per week: Not on file     Minutes per session: Not on file     Stress: Not on file   Relationships     Social connections:     Talks on  "phone: Not on file     Gets together: Not on file     Attends Sabianist service: Not on file     Active member of club or organization: Not on file     Attends meetings of clubs or organizations: Not on file     Relationship status: Not on file     Intimate partner violence:     Fear of current or ex partner: Not on file     Emotionally abused: Not on file     Physically abused: Not on file     Forced sexual activity: Not on file   Other Topics Concern     Not on file   Social History Narrative    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       Past Surgical History:   Procedure Laterality Date     INSERTION OF CONTRACEPTIVE CAPSULE       TYMPANOPLASTY       Allergies   Allergen Reactions     Benadryl [Diphenhydramine Hcl] Hives and Wheezing     Prazosin Hives and Wheezing     Prozac [Fluoxetine] Hives     Sulfa (Sulfonamide Antibiotics) Hives     Past Medical History:   Diagnosis Date     Chronic hand pain      Hearing loss      Migraine     opthalmic mostly     VITALS:  Vitals:    03/11/19 1349   BP: 118/56   Patient Site: Left Arm   Patient Position: Sitting   Cuff Size: Adult Regular   Pulse: 80   Resp: 20   Temp: 99  F (37.2  C)   TempSrc: Tympanic   Weight: 216 lb 1.6 oz (98 kg)   Height: 5' 7\" (1.702 m)     Wt Readings from Last 3 Encounters:   03/11/19 216 lb 1.6 oz (98 kg)   03/06/19 214 lb 14.4 oz (97.5 kg)   02/26/19 214 lb 14.4 oz (97.5 kg)     Body mass index is 33.85 kg/m .    PHYSICAL EXAM:  General Appearance: In no acute distress  /56 (Patient Site: Left Arm, Patient Position: Sitting, Cuff Size: Adult Regular)   Pulse 80   Temp 99  F (37.2  C) (Tympanic)   Resp 20   Ht 5' 7\" (1.702 m)   Wt 216 lb 1.6 oz (98 kg)   LMP 02/27/2019 (Approximate)   BMI 33.85 kg/m    EYES: Clear, without inflammation, fundi are unremarkable, discs flat   HEENT: Without congestion or inflammation  NECK:  without adenopathy or thryroid enlargement  RESPIRATORY: Clear to auscultation  CARDIOVASCULAR: " S1, S2, without murmur   ABDOMEN: soft, flat, and non-tender, without mass, rebound, or guarding  RECTAL: deferred  GENITOURINARY: normal testes and phallus  ETREMITIES: No joint swelling, or inflammation, distal pulses diminished, no ulcer or edema  NEUROLOGIC: Non-focal, no arm or leg  weakness, speech is clear, gait is normal  PSYCHIATRIC: Oriented X 3, without confusion, behavior and affect normal, thinking is clear    ADDITIONAL HISTORY SUMMARIZED (2):reviewed allergy evaluation  DECISION TO OBTAIN EXTRA INFORMATION (1): None.   RADIOLOGY TESTS (1): None.  LABS (1):reviewed recent lab testing  MEDICINE TESTS (1): None.  INDEPENDENT REVIEW (2 each): None.     TOTAL: 3    Current Outpatient Medications   Medication Sig Dispense Refill     mirtazapine (REMERON) 15 MG tablet new has not started yet  1     No current facility-administered medications for this visit.

## 2021-06-24 NOTE — PROGRESS NOTES
"Assessment:     Chronic recurrent autoimmune urticaria. I do not feel that there is a specific allergy trigger.     I question previous drug allergy diagnosis, however there is no routine available test for these medications.    Plan:     Cetirizine 10 mg a.m. can be used daily preventatively.  And evening dose can be added if needed.    Discussed common triggering factors such as heat, Nsaids, pressure, alcohol, stress etc...    Consider a drug challenge with diphenhydramine (Benadryl).  This may be helpful because Benadryl can be a useful medication in treating hives.    At this time recommend avoiding sulfa antibiotics, prazosin and Prozac.  If needing these medications, further evaluation could be done.  ____________________________________________________________________________     Kassandra is here for evaluation of allergic reactions.  She reports she has had allergic reactions multiple times in her life.  Usually this consist of hives.  She has pictures of a rash that she had recently 1 month ago.  She looks to have urticarial lesions on her back.  Other areas are small red bumps.  She describes this rash as very itchy.  Patient had gone into clinic on February 26.  At that time was having a rash that was described as \"mild hive-like rash present posterior to ears bilaterally worse on the right\".  No other signs of allergy.  Exam was otherwise normal.  She was instructed to take cetirizine and Pepcid.  She been on Prozac for 3 weeks is felt that that might be the cause.  She had stopped it for a week and upon restarting it had rash.  This was approximately 4 hours after taking the medicine.  She also had sweating vomiting prior to going in.  She took 20 mg of montelukast asked which seemed to help.  Prior to this she did eat a taco but she since had those same ingredients without a problem.  This is something she routinely eats.  Patient has had similar rash in high school with Benadryl and sulfa antibiotic.  " She is avoided those since then.  She also had similar rash and breathing problems with prazosin 1 month prior.  She has stopped that.  Despite stopping at the hives kept coming.  She does use of ibuprofen occasionally for pain.  She has been having some new back pain.    Review of symptoms: Headache: Itchy watery eyes, sneezing, rhinorrhea, joint pain.  As above, otherwise negative    Allergies: No known allergies to  latex, foods or hymenoptera venom.  Drug allergy as above    Family history: Patient has a grandmother with reacting to multiple medications.  Patient reports that she has severe medication allergies.  Many members of the family with environmental allergies.  She has a brother with asthma.    Social history: Currently has been in the same apartment for 1 year.  His forced air heat.  There are 3 cats in the home.  She has had them for 2 years.  No current cigarette smoking.    Medications: reviewed in chart    Physical Exam:  General:  Alert and in no apparent distress.  Eyes:  Sclera clear.  Ears: TMs translucent grey with bony landmarks visible. Nose: Pale, boggy mucosal membranes.  Throat: Pink, moist.  No lesions.  Neck: Supple.  No lymphadenopathy.  Lungs: CTA.  CV: Regular rate and rhythm. Extremities: Well perfused.  No clubbing or cyanosis. Skin: Faint eczematous rash bilateral flexor creases of upper extremities.    45 min spent in direct contact with the patient.  More than 50% in counseling regarding drug allergy and chronic hives.  Discussed etiology and prognosis of chronic hives.  Discussed management of hives.

## 2021-06-24 NOTE — PATIENT INSTRUCTIONS - HE
Assessment:     Chronic recurrent autoimmune urticaria. I do not feel that there is a specific allergy trigger.     I question previous drug allergy diagnosis, however there is no routine available test for these medications.    Plan:     Cetirizine 10 mg a.m. can be used daily preventatively.  And evening dose can be added if needed.    Discussed common triggering factors such as heat, Nsaids, pressure, alcohol, stress etc...    Consider a drug challenge with diphenhydramine (Benadryl).  This may be helpful because Benadryl can be a useful medication in treating hives.    At this time recommend avoiding sulfa antibiotics, prazosin and Prozac.  If needing these medications, further evaluation could be done.

## 2021-06-24 NOTE — PATIENT INSTRUCTIONS - HE
1. Begin taking antihistamine medications according to bottle instructions.   2. Seek emergency medical attention if you develop difficulty breathing, lip/tongue swelling, or fainting.   3. Discontinue Prozac for right now.

## 2021-06-25 NOTE — PROGRESS NOTES
Pt desires Nexplanon to be removed. Patient is in a lesbian relationship.  Desires no birth control at this time.     Nexplanon Removal Procedure Note    Pre-operative Diagnosis: desires Nexplanon removed    Post-operative Diagnosis: same    Indications: continued vaginal spotting    Procedure Details     The risks (including infection, bleeding, pain) and benefits of the procedure were explained to the patient and verbal informed consent was obtained.      Pt was positioned on exam table with left, non dominant arm next to her on the table. Insertion site was cleaned with alcohol prep at 7cm from elbow crease. 1%Lidocaine inserted intradermally and 0.5 cm incision made with #10 scalpel. Tip of Nexplanon was located and grasped with forceps, then removed without difficulty. Site was covered with band aid and a pressure dressing. Pt instructed not to lift heavy items with left arm for 24 hours, Ibuprofen prn for pain or ice to site as need for bruising    Nexplanon Information:  discarded    Condition:  Stable    Complications:  None    Plan:    The patient was advised to call for any fever or for prolonged or severe pain or bleeding. She was advised to use OTC ibuprofen as needed for mild to moderate pain.   Encourage to consider emergency contraception if unprotected intercourse in past 7 days.     ZAID Ceja, [place], LAYO

## 2021-10-10 ENCOUNTER — HEALTH MAINTENANCE LETTER (OUTPATIENT)
Age: 26
End: 2021-10-10

## 2022-03-29 ENCOUNTER — OFFICE VISIT (OUTPATIENT)
Dept: URGENT CARE | Facility: URGENT CARE | Age: 27
End: 2022-03-29
Payer: COMMERCIAL

## 2022-03-29 VITALS
TEMPERATURE: 98.1 F | HEART RATE: 107 BPM | OXYGEN SATURATION: 99 % | BODY MASS INDEX: 33.83 KG/M2 | DIASTOLIC BLOOD PRESSURE: 87 MMHG | WEIGHT: 216 LBS | SYSTOLIC BLOOD PRESSURE: 133 MMHG

## 2022-03-29 DIAGNOSIS — J32.9 SINOBRONCHITIS: Primary | ICD-10-CM

## 2022-03-29 DIAGNOSIS — J40 SINOBRONCHITIS: Primary | ICD-10-CM

## 2022-03-29 PROBLEM — M25.50 HYPERMOBILITY ARTHRALGIA: Status: ACTIVE | Noted: 2019-04-24

## 2022-03-29 PROBLEM — F33.9 RECURRENT MAJOR DEPRESSIVE DISORDER (H): Status: ACTIVE | Noted: 2019-03-11

## 2022-03-29 PROBLEM — M54.50 CHRONIC BILATERAL LOW BACK PAIN: Status: ACTIVE | Noted: 2019-04-24

## 2022-03-29 PROBLEM — J30.1 ALLERGIC RHINITIS DUE TO POLLEN: Status: ACTIVE | Noted: 2019-03-11

## 2022-03-29 PROBLEM — F41.1 GENERALIZED ANXIETY DISORDER: Status: ACTIVE | Noted: 2019-03-11

## 2022-03-29 PROBLEM — G89.29 CHRONIC BILATERAL LOW BACK PAIN: Status: ACTIVE | Noted: 2019-04-24

## 2022-03-29 PROCEDURE — 99203 OFFICE O/P NEW LOW 30 MIN: CPT | Performed by: NURSE PRACTITIONER

## 2022-03-29 RX ORDER — BENZONATATE 100 MG/1
100 CAPSULE ORAL 3 TIMES DAILY PRN
Qty: 21 CAPSULE | Refills: 0 | Status: SHIPPED | OUTPATIENT
Start: 2022-03-29 | End: 2022-04-05

## 2022-03-29 NOTE — PROGRESS NOTES
Chief Complaint   Patient presents with     Urgent Care     Nasal Congestion     c/o nasal congestion for 2 months     SUBJECTIVE:  Kassandra Arcos is a 26 year old female presenting with nasal congestion, sinus pressure, pain, headache, post nasal drip, thick green mucus, gravel cough for 2 months. Symptoms have waxed and waned. She is new to working in a  this past year. No fevers, bloody sputum, chest pain. Negative covid tests.    Past Medical History:   Diagnosis Date     Anxiety      Bipolar I disorder (H)      PTSD (post-traumatic stress disorder)      lamoTRIgine (LAMICTAL) 25 MG tablet, Take 1 tablet (25 mg) by mouth daily x 14 days, then take 2 tablets (50 mg) by mouth daily  OLANZapine (ZYPREXA) 10 MG tablet, Take 1 tablet (10 mg) by mouth At Bedtime and take 1 tablet daily as needed for agitation    No current facility-administered medications on file prior to visit.    Social History     Tobacco Use     Smoking status: Never Smoker     Smokeless tobacco: Never Used     Tobacco comment: Juul user currently   Substance Use Topics     Alcohol use: Yes     Comment: Alcoholic Drinks/day: rarely     Allergies   Allergen Reactions     Diphenhydramine Hives     Other reaction(s): Wheezing     Fluoxetine      Hives, excessive mucous production, fever, migraine     Prazosin      difficulty breathing, airway swelling, hives     Sulfa Drugs      Hives, swelling     Review of Systems   All systems negative except for those listed above in HPI.    OBJECTIVE:   /87   Pulse 107   Temp 98.1  F (36.7  C) (Tympanic)   Wt 98 kg (216 lb)   LMP 03/08/2022   SpO2 99%   BMI 33.83 kg/m       Physical Exam  Vitals reviewed.   Constitutional:       General: She is not in acute distress.     Appearance: Normal appearance. She is not ill-appearing, toxic-appearing or diaphoretic.   HENT:      Head: Normocephalic and atraumatic.      Ears:      Comments: Scar tissue on tms and clear fluid.     Nose: Congestion and  rhinorrhea present.      Mouth/Throat:      Mouth: Mucous membranes are moist.      Pharynx: Oropharynx is clear. No oropharyngeal exudate or posterior oropharyngeal erythema.   Cardiovascular:      Rate and Rhythm: Normal rate.      Pulses: Normal pulses.      Heart sounds: Normal heart sounds.   Pulmonary:      Effort: Respiratory distress present.      Breath sounds: No stridor. No wheezing, rhonchi or rales.   Chest:      Chest wall: No tenderness.   Musculoskeletal:         General: Normal range of motion.   Lymphadenopathy:      Cervical: No cervical adenopathy.   Skin:     General: Skin is warm and dry.      Findings: No rash.   Neurological:      General: No focal deficit present.      Mental Status: She is alert and oriented to person, place, and time.   Psychiatric:         Mood and Affect: Mood normal.         Behavior: Behavior normal.       ASSESSMENT:    ICD-10-CM    1. Sinobronchitis  J32.9 amoxicillin-clavulanate (AUGMENTIN) 875-125 MG tablet    J40 benzonatate (TESSALON) 100 MG capsule     PLAN:     Augmentin given symptoms for 2 months  Flonase (fluticasone) 2 sprays in each nostril daily until symptoms resolve, then continue 1 spray in each nostril for at least 5 more days.  Take Tylenol or an NSAID such as ibuprofen or naproxen as needed for pain.  May use netti pot with bottled or distilled water and saline packets to flush sinuses.  Sudafed (pseudoephedrine) behind the pharmacist counter for 3-5 days helps relieve congestion.  Afrin (oxymetazoline) nasal spray twice daily for 3 days. Stop after 3 days.  Mucinex (guiafenesin) thins mucus and may help it to loosen more quickly  Saline drops or nasal sprays may loosen mucus.  Sit in the bathroom with the door closed and hot shower running to loosen mucus.  Contact primary care clinic if you do not have any relief from your symptoms after 10 days.  Present to emergency room for significantly increasing pain, persistent high fever >102F,  swelling/redness around your eyes, changes in your vision or ability to move your eyes, altered mental status or a severe headache.    Follow up with primary care provider with any problems, questions or concerns or if symptoms worsen or fail to improve. Patient agreed to plan and verbalized understanding.    Mariia Taylor, [facility]

## 2022-05-21 ENCOUNTER — HEALTH MAINTENANCE LETTER (OUTPATIENT)
Age: 27
End: 2022-05-21

## 2022-09-18 ENCOUNTER — HEALTH MAINTENANCE LETTER (OUTPATIENT)
Age: 27
End: 2022-09-18

## 2022-11-11 ENCOUNTER — TRANSFERRED RECORDS (OUTPATIENT)
Dept: HEALTH INFORMATION MANAGEMENT | Facility: CLINIC | Age: 27
End: 2022-11-11

## 2023-06-04 ENCOUNTER — HEALTH MAINTENANCE LETTER (OUTPATIENT)
Age: 28
End: 2023-06-04

## 2023-10-16 ENCOUNTER — VIRTUAL VISIT (OUTPATIENT)
Dept: URGENT CARE | Facility: CLINIC | Age: 28
End: 2023-10-16
Payer: COMMERCIAL

## 2023-10-16 DIAGNOSIS — J01.90 ACUTE SINUSITIS WITH SYMPTOMS > 10 DAYS: Primary | ICD-10-CM

## 2023-10-16 PROCEDURE — 99213 OFFICE O/P EST LOW 20 MIN: CPT | Mod: VID

## 2023-10-16 NOTE — PROGRESS NOTES
Kassandra is a 27 year old who is being evaluated via a billable video visit.      How would you like to obtain your AVS? MyChart  If the video visit is dropped, the invitation should be resent by: Text to cell phone: [phone]  Will anyone else be joining your video visit? No          Assessment & Plan     Acute sinusitis with symptoms > 10 days    - amoxicillin-clavulanate (AUGMENTIN) 875-125 MG tablet; Take 1 tablet by mouth 2 times daily      7 minutes spent by me on the date of the encounter doing chart review, history and exam, documentation and further activities per the note       Patient Instructions   Augmentin twice daily for 10 days     May eat yogurt to help prevent looser stool     Return in about 2 weeks (around 10/30/2023) for if not improved.    Virtual Urgent Care  [facility] VIRTUAL URGENT CARE    Subjective   Kassandra is a 27 year old, presenting for the following health issues:  No chief complaint on file.      HPI   I have been sick with cold-like symptoms for 2+ weeks and I believe I have developed a sinus infection.     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      Objective           Vitals:  No vitals were obtained today due to virtual visit.    Physical Exam   GENERAL: alert and no distress- c/o facial pain around eyes and naasal secretion increase and colored   EYES: Eyes grossly normal to inspection.  No discharge or erythema, or obvious scleral/conjunctival abnormalities.  RESP: No audible wheeze, cough, or visible cyanosis.  No visible retractions or increased work of breathing.    SKIN: Visible skin clear. No  significant rash, abnormal pigmentation or lesions.  NEURO: Cranial nerves grossly intact.  Mentation and speech appropriate for age.  PSYCH: Mentation appears normal, affect normal/bright, judgement and insight intact, normal speech and appearance well-groomed.            Video-Visit Details    Type of service:  Video Visit     Originating Location (pt. Location): Home    Distant Location (provider location):  Off-site  Platform used for Video Visit: Crosswise

## 2023-11-01 ENCOUNTER — OFFICE VISIT (OUTPATIENT)
Dept: FAMILY MEDICINE | Facility: CLINIC | Age: 28
End: 2023-11-01
Payer: COMMERCIAL

## 2023-11-01 VITALS
HEIGHT: 67 IN | BODY MASS INDEX: 34.53 KG/M2 | WEIGHT: 220 LBS | SYSTOLIC BLOOD PRESSURE: 119 MMHG | DIASTOLIC BLOOD PRESSURE: 84 MMHG | OXYGEN SATURATION: 96 % | HEART RATE: 61 BPM

## 2023-11-01 DIAGNOSIS — R10.13 ABDOMINAL PAIN, EPIGASTRIC: ICD-10-CM

## 2023-11-01 DIAGNOSIS — R11.2 NAUSEA AND VOMITING, UNSPECIFIED VOMITING TYPE: ICD-10-CM

## 2023-11-01 DIAGNOSIS — F31.64 BIPOLAR AFFECTIVE DISORDER, MIXED, SEVERE, WITH PSYCHOTIC BEHAVIOR (H): ICD-10-CM

## 2023-11-01 DIAGNOSIS — Z76.89 ENCOUNTER TO ESTABLISH CARE: Primary | ICD-10-CM

## 2023-11-01 DIAGNOSIS — K21.00 GASTROESOPHAGEAL REFLUX DISEASE WITH ESOPHAGITIS, UNSPECIFIED WHETHER HEMORRHAGE: ICD-10-CM

## 2023-11-01 DIAGNOSIS — L50.9 URTICARIA: ICD-10-CM

## 2023-11-01 LAB
ALBUMIN SERPL BCG-MCNC: 4.3 G/DL (ref 3.5–5.2)
ALP SERPL-CCNC: 65 U/L (ref 35–129)
ALT SERPL W P-5'-P-CCNC: 10 U/L (ref 0–70)
ANION GAP SERPL CALCULATED.3IONS-SCNC: 9 MMOL/L (ref 7–15)
AST SERPL W P-5'-P-CCNC: 18 U/L (ref 0–45)
BILIRUB SERPL-MCNC: 0.2 MG/DL
BUN SERPL-MCNC: 10.5 MG/DL (ref 6–20)
CALCIUM SERPL-MCNC: 9.3 MG/DL (ref 8.6–10)
CHLORIDE SERPL-SCNC: 108 MMOL/L (ref 98–107)
CREAT SERPL-MCNC: 0.7 MG/DL (ref 0.51–1.17)
CRP SERPL-MCNC: <3 MG/L
DEPRECATED HCO3 PLAS-SCNC: 24 MMOL/L (ref 22–29)
EGFRCR SERPLBLD CKD-EPI 2021: >90 ML/MIN/1.73M2
ERYTHROCYTE [DISTWIDTH] IN BLOOD BY AUTOMATED COUNT: 12.6 % (ref 10–15)
ERYTHROCYTE [SEDIMENTATION RATE] IN BLOOD BY WESTERGREN METHOD: 7 MM/HR (ref 0–20)
GLUCOSE SERPL-MCNC: 105 MG/DL (ref 70–99)
HCT VFR BLD AUTO: 39.8 % (ref 35–47)
HGB BLD-MCNC: 12.9 G/DL (ref 11.7–15.7)
LIPASE SERPL-CCNC: 31 U/L (ref 13–60)
MCH RBC QN AUTO: 29.7 PG (ref 26.5–33)
MCHC RBC AUTO-ENTMCNC: 32.4 G/DL (ref 31.5–36.5)
MCV RBC AUTO: 92 FL (ref 78–100)
PLATELET # BLD AUTO: 403 10E3/UL (ref 150–450)
POTASSIUM SERPL-SCNC: 4.4 MMOL/L (ref 3.4–5.3)
PROT SERPL-MCNC: 7 G/DL (ref 6.4–8.3)
RBC # BLD AUTO: 4.34 10E6/UL (ref 3.8–5.2)
SODIUM SERPL-SCNC: 141 MMOL/L (ref 135–145)
WBC # BLD AUTO: 7.3 10E3/UL (ref 4–11)

## 2023-11-01 PROCEDURE — 90480 ADMN SARSCOV2 VAC 1/ONLY CMP: CPT | Performed by: STUDENT IN AN ORGANIZED HEALTH CARE EDUCATION/TRAINING PROGRAM

## 2023-11-01 PROCEDURE — 85652 RBC SED RATE AUTOMATED: CPT | Performed by: STUDENT IN AN ORGANIZED HEALTH CARE EDUCATION/TRAINING PROGRAM

## 2023-11-01 PROCEDURE — 99214 OFFICE O/P EST MOD 30 MIN: CPT | Mod: 25 | Performed by: STUDENT IN AN ORGANIZED HEALTH CARE EDUCATION/TRAINING PROGRAM

## 2023-11-01 PROCEDURE — 85027 COMPLETE CBC AUTOMATED: CPT | Performed by: STUDENT IN AN ORGANIZED HEALTH CARE EDUCATION/TRAINING PROGRAM

## 2023-11-01 PROCEDURE — 90715 TDAP VACCINE 7 YRS/> IM: CPT | Performed by: STUDENT IN AN ORGANIZED HEALTH CARE EDUCATION/TRAINING PROGRAM

## 2023-11-01 PROCEDURE — 83690 ASSAY OF LIPASE: CPT | Performed by: STUDENT IN AN ORGANIZED HEALTH CARE EDUCATION/TRAINING PROGRAM

## 2023-11-01 PROCEDURE — 90686 IIV4 VACC NO PRSV 0.5 ML IM: CPT | Performed by: STUDENT IN AN ORGANIZED HEALTH CARE EDUCATION/TRAINING PROGRAM

## 2023-11-01 PROCEDURE — 90471 IMMUNIZATION ADMIN: CPT | Performed by: STUDENT IN AN ORGANIZED HEALTH CARE EDUCATION/TRAINING PROGRAM

## 2023-11-01 PROCEDURE — 80053 COMPREHEN METABOLIC PANEL: CPT | Performed by: STUDENT IN AN ORGANIZED HEALTH CARE EDUCATION/TRAINING PROGRAM

## 2023-11-01 PROCEDURE — 86140 C-REACTIVE PROTEIN: CPT | Performed by: STUDENT IN AN ORGANIZED HEALTH CARE EDUCATION/TRAINING PROGRAM

## 2023-11-01 PROCEDURE — 90472 IMMUNIZATION ADMIN EACH ADD: CPT | Performed by: STUDENT IN AN ORGANIZED HEALTH CARE EDUCATION/TRAINING PROGRAM

## 2023-11-01 PROCEDURE — 91320 SARSCV2 VAC 30MCG TRS-SUC IM: CPT | Performed by: STUDENT IN AN ORGANIZED HEALTH CARE EDUCATION/TRAINING PROGRAM

## 2023-11-01 PROCEDURE — 36415 COLL VENOUS BLD VENIPUNCTURE: CPT | Performed by: STUDENT IN AN ORGANIZED HEALTH CARE EDUCATION/TRAINING PROGRAM

## 2023-11-01 RX ORDER — PROPRANOLOL HCL 60 MG
60 CAPSULE, EXTENDED RELEASE 24HR ORAL DAILY
COMMUNITY
Start: 2023-10-18 | End: 2023-11-01

## 2023-11-01 RX ORDER — ONDANSETRON 4 MG/1
4 TABLET, ORALLY DISINTEGRATING ORAL EVERY 8 HOURS PRN
Qty: 21 TABLET | Refills: 0 | Status: SHIPPED | OUTPATIENT
Start: 2023-11-01

## 2023-11-01 RX ORDER — PANTOPRAZOLE SODIUM 40 MG/1
40 TABLET, DELAYED RELEASE ORAL DAILY
Qty: 30 TABLET | Refills: 1 | Status: SHIPPED | OUTPATIENT
Start: 2023-11-01 | End: 2023-11-29

## 2023-11-01 RX ORDER — PROPRANOLOL HCL 60 MG
120 CAPSULE, EXTENDED RELEASE 24HR ORAL DAILY
COMMUNITY
Start: 2023-11-01

## 2023-11-01 RX ORDER — QUETIAPINE FUMARATE 50 MG/1
50 TABLET, FILM COATED ORAL AT BEDTIME
COMMUNITY
Start: 2023-11-01

## 2023-11-01 RX ORDER — LAMOTRIGINE 200 MG/1
200 TABLET ORAL DAILY
COMMUNITY
Start: 2023-11-01

## 2023-11-01 RX ORDER — QUETIAPINE FUMARATE 50 MG/1
50 TABLET, FILM COATED ORAL 2 TIMES DAILY
COMMUNITY
End: 2023-11-01

## 2023-11-01 ASSESSMENT — PATIENT HEALTH QUESTIONNAIRE - PHQ9: SUM OF ALL RESPONSES TO PHQ QUESTIONS 1-9: 3

## 2023-11-01 NOTE — PROGRESS NOTES
"  {PROVIDER CHARTING PREFERENCE:300545}    Subjective   Kassandra is a 27 year old, presenting for the following health issues:  Consult (Pt wants to establish care.)  {(!) Visit Details have not yet been documented.  Please enter Visit Details and then use this list to pull in documentation. (Optional):977095}    HPI     {MA/LPN/RN Pre-Provider Visit Orders- hCG/UA/Strep (Optional):159380}  {SUPERLIST (Optional):239162}  {additonal problems for provider to add (Optional):328449}      Review of Systems   {ROS COMP (Optional):154581}      Objective    /84   Pulse 61   Ht 1.702 m (5' 7\")   Wt 99.8 kg (220 lb)   SpO2 96%   BMI 34.46 kg/m    Body mass index is 34.46 kg/m .  Physical Exam   {Exam List (Optional):789353}    {Diagnostic Test Results (Optional):022974}    {AMBULATORY ATTESTATION (Optional):282560}              "

## 2023-11-01 NOTE — PROGRESS NOTES
"  Assessment & Plan     Encounter to establish care  Patient presenting new to clinic today with multiple chronic health concerns (see below). Does not have previous PCP but follows regularly with psychiatrist. PARVIN signed.     Gastroesophageal reflux disease with esophagitis, unspecified whether hemorrhage  Abdominal pain, epigastric  Patient has daily acid reflux with occasional epigastric discomfort. Often triggered by positional changes like bending over or lying flat. Symptoms most consistent with GERD. Patient thinks they may have tried omeprazole in the past with no relief. No prior H pylori testing. Patient provided with H Pylori stool antigen kit and instructed to begin pantoprazole 40mg every day AFTER stool test is completed. Will plan to trial PPI for 1 month and follow up.     Nausea and vomiting, unspecified vomiting type  Patient reports 1 year of episodic non-bloody, bilious vomiting that is prolonged and painful and accompanied by \"sulfuric, rotten egg\" taste and burping. These episodes occur 1-4 times per month and do not seem to be triggered by certain foods. May or may not be related to acid reflux (see above). Differential is extremely broad including gastroparesis, pancreatitis, liver/gallstone pathology, PUD, food allergy. Also has joint pain and swelling, prompting further evaluation of underlying inflammatory vs autoimmune disorder. Has also had several episodes of head to toe urticaria with unknown trigger (see below), could be related to nausea and vomiting and sign of possible mast cell activation syndrome?? Will plan to treat GERD as above, along with initial lab workup including CMP, CBC, CRP, ESR, lipase. Will also treat nausea/vomiting symptomatically with Zofran. If these interventions are not successful, consider referral to GI for further workup including possible EGD, gastric emptying study, etc.   - Comprehensive metabolic panel  - CRP inflammation  - Erythrocyte sedimentation " "rate auto  - CBC with platelets  - Lipase  - Helicobacter pylori Antigen Stool  - pantoprazole (PROTONIX) 40 MG EC tablet  Dispense: 30 tablet; Refill: 1  - ondansetron (ZOFRAN ODT) 4 MG ODT tab  Dispense: 21 tablet; Refill: 0    Bipolar affective disorder, mixed, severe, with psychotic behavior (H)  Follows with Psychiatry, reports stable med plan currently.   - lamoTRIgine (LAMICTAL) 200 MG tablet  - propranolol ER (INDERAL LA) 60 MG 24 hr capsule  - QUEtiapine (SEROQUEL) 50 MG tablet      Ordering of each unique test  Prescription drug management  22 minutes spent by me on the date of the encounter doing chart review, history and exam, documentation and further activities per the note       BMI:   Estimated body mass index is 34.46 kg/m  as calculated from the following:    Height as of this encounter: 1.702 m (5' 7\").    Weight as of this encounter: 99.8 kg (220 lb).   Not discussed         Return in about 1 month (around 12/1/2023).    Suzie Lawler, MS3  [employer]    I was present with the medical student who participated in the service and in the documentation of this note. I have verified the history and personally performed the physical exam and medical decision making, and have verified the content of the note, which accurately reflects my assessment of the patient and the plan of care.   Linh Blair DO   [facility] NORMA Cannon is a 27 year old, presenting for the following health issues:  Consult (Pt wants to establish care.)      HPI   Moved to [place] in 2015. Has not had a PCP. Looking for PCP to manage multiple chronic health problems     GI problems  - For the past year \"extreme acid reflux\"   - Started over the past year - kind of all at once  - A few times per month has \"sulfuric burps and extreme bloating\" with vomiting  that is \"thick, chunky\"   - At the beginning of vomiting episode, the vomit has undigested food but by the end of " "episode it is \"bright/neon green bile\".  - May correspond to period?  - Painful and prolonged vomiting, non-projectile.    - \"Tastes like eating rotting eggs\"  - Burps a lot.   - Sometimes has diarrhea  - Has tried OTC Tums, Pepto Bismol and this has not helped.   - Has not noticed any triggers for vomiting, often happens in middle of night when laying flat. Bending over agitates acid reflux. Tomatoes aggravates reflux.     Works with children, chronically ill.     Chronic pain: joints, shoulders, low back.  Has swelling of joints. Has lost mobility in hands, cramping, unable to write but can paint. Is hypermobile in most joints. Wants to see DO.    New pain in left foot, outside of left foot. No known injury.     Mood: Brandi Mccann is psychiatrist, managing medications. Sees therapist once/week.      Allergic reactions: Has had several episodes of head to toe hives with unknown trigger. Went to allergy specialist, tested negative for everything.     Rash but also went into a state that \"pretty much was all of the symptoms of anaphylaxis besides airway\" -- vomiting, diarrhea, rash. Has happened 4-5 times maybe once a year. Will wait it out and go away. Last time had full body hives for 4 days. Raised and itchy.               Review of Systems    ROS: 10 point ROS neg other than the symptoms noted above in the HPI.        Objective    /84   Pulse 61   Ht 1.702 m (5' 7\")   Wt 99.8 kg (220 lb)   SpO2 96%   BMI 34.46 kg/m    Body mass index is 34.46 kg/m .  Physical Exam     GENERAL: Healthy, alert and no distress  EYES: Eyes grossly normal to inspection.  No discharge or erythema, or obvious scleral/conjunctival abnormalities.  CV: RRR, normal S1, S2 with no murmurs, rubs, or gallops  RESP: CTAB. No audible wheeze, cough, or visible cyanosis.  No visible retractions or increased work of breathing.    ABDOMEN: Soft, non-distended. Non-tender to palpation in LUQ, RUQ, and epigastric region. Discomfort with " palpation in LLQ and RLQ with radiation to low back. No signs of acute abdomen.   SKIN: Visible skin clear. No significant rash, abnormal pigmentation or lesions.  MSK: No visible deformities or swelling noted.   NEURO: Alert and oriented. Mentation and speech appropriate for age.  PSYCH: Affect and behavior normal, judgement and insight intact     Results for orders placed or performed in visit on 11/01/23   Comprehensive metabolic panel     Status: Abnormal   Result Value Ref Range    Sodium 141 135 - 145 mmol/L    Potassium 4.4 3.4 - 5.3 mmol/L    Carbon Dioxide (CO2) 24 22 - 29 mmol/L    Anion Gap 9 7 - 15 mmol/L    Urea Nitrogen 10.5 6.0 - 20.0 mg/dL    Creatinine 0.70 0.51 - 1.17 mg/dL    GFR Estimate >90 >60 mL/min/1.73m2    Calcium 9.3 8.6 - 10.0 mg/dL    Chloride 108 (H) 98 - 107 mmol/L    Glucose 105 (H) 70 - 99 mg/dL    Alkaline Phosphatase 65 35 - 129 U/L    AST 18 0 - 45 U/L    ALT 10 0 - 70 U/L    Protein Total 7.0 6.4 - 8.3 g/dL    Albumin 4.3 3.5 - 5.2 g/dL    Bilirubin Total 0.2 <=1.2 mg/dL   CRP inflammation     Status: Normal   Result Value Ref Range    CRP Inflammation <3.00 <5.00 mg/L   Erythrocyte sedimentation rate auto     Status: Normal   Result Value Ref Range    Erythrocyte Sedimentation Rate 7 0 - 20 mm/hr   CBC with platelets     Status: Normal   Result Value Ref Range    WBC Count 7.3 4.0 - 11.0 10e3/uL    RBC Count 4.34 3.80 - 5.20 10e6/uL    Hemoglobin 12.9 11.7 - 15.7 g/dL    Hematocrit 39.8 35.0 - 47.0 %    MCV 92 78 - 100 fL    MCH 29.7 26.5 - 33.0 pg    MCHC 32.4 31.5 - 36.5 g/dL    RDW 12.6 10.0 - 15.0 %    Platelet Count 403 150 - 450 10e3/uL   Lipase     Status: Normal   Result Value Ref Range    Lipase 31 13 - 60 U/L

## 2023-11-28 DIAGNOSIS — R11.2 NAUSEA AND VOMITING, UNSPECIFIED VOMITING TYPE: ICD-10-CM

## 2023-11-29 PROCEDURE — 87338 HPYLORI STOOL AG IA: CPT | Performed by: STUDENT IN AN ORGANIZED HEALTH CARE EDUCATION/TRAINING PROGRAM

## 2023-11-29 RX ORDER — PANTOPRAZOLE SODIUM 40 MG/1
40 TABLET, DELAYED RELEASE ORAL DAILY
Qty: 90 TABLET | Refills: 3 | Status: SHIPPED | OUTPATIENT
Start: 2023-11-29

## 2023-11-30 LAB — H PYLORI AG STL QL IA: NEGATIVE

## 2023-12-11 ENCOUNTER — OFFICE VISIT (OUTPATIENT)
Dept: FAMILY MEDICINE | Facility: CLINIC | Age: 28
End: 2023-12-11
Payer: COMMERCIAL

## 2023-12-11 VITALS
DIASTOLIC BLOOD PRESSURE: 76 MMHG | TEMPERATURE: 98.9 F | SYSTOLIC BLOOD PRESSURE: 115 MMHG | OXYGEN SATURATION: 98 % | HEIGHT: 67 IN | HEART RATE: 71 BPM | WEIGHT: 220 LBS | BODY MASS INDEX: 34.53 KG/M2 | RESPIRATION RATE: 19 BRPM

## 2023-12-11 DIAGNOSIS — K21.9 GASTROESOPHAGEAL REFLUX DISEASE WITHOUT ESOPHAGITIS: Primary | ICD-10-CM

## 2023-12-11 DIAGNOSIS — R09.82 POST-NASAL DRIP: ICD-10-CM

## 2023-12-11 PROCEDURE — 99213 OFFICE O/P EST LOW 20 MIN: CPT | Mod: 25 | Performed by: STUDENT IN AN ORGANIZED HEALTH CARE EDUCATION/TRAINING PROGRAM

## 2023-12-11 NOTE — PROGRESS NOTES
"  Assessment & Plan     Gastroesophageal reflux disease without esophagitis  Symptoms resolved with PPI x1 month. Discussed long term planning, ideally avoiding chronic PPI use. Pt will work towards taper, dosing every other day and decrease from there.   - follow up at next visit  - may need to bridge with H2 blocker     Post-nasal drip  Hoarse voice, post-viral   Advised nasal saline rinse and nasal CS spray    HCM  Follow up CPE, discuss cervical cancer screening next visit       22 minutes spent by me on the date of the encounter doing chart review, history and exam, documentation and further activities per the note       BMI:   Estimated body mass index is 34.98 kg/m  as calculated from the following:    Height as of this encounter: 1.689 m (5' 6.5\").    Weight as of this encounter: 99.8 kg (220 lb).           Return in about 2 months (around 2/11/2024) for Follow up, with me, for preventive complete physical visit.    Linh Blair DO  [facility] NORMA Cannon is a 27 year old, presenting for the following health issues:  Gastrointestinal Problem (Patient states the medication seems to work and works awesome)        12/11/2023     4:36 PM   Additional Questions   Roomed by jacky   Accompanied by self       HPI     Sick 2 months ago for URI, tested negative for COVID.  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 No n/v, epigastric pain, reflux, or burping. " "        Review of Systems   Pertinent positives and negatives per HPI.        Objective    /76   Pulse 71   Temp 98.9  F (37.2  C) (Oral)   Resp 19   Ht 1.689 m (5' 6.5\")   Wt 99.8 kg (220 lb)   SpO2 98%   BMI 34.98 kg/m    Body mass index is 34.98 kg/m .  Physical Exam   GENERAL: healthy, alert and no distress  EYES: Eyes grossly normal to inspection, PERRL and conjunctivae and sclerae normal  HENT: ear canals and TM's normal, nose and mouth without ulcers or lesions. Posterior oroparynx mild cobblestoning, post nasal drip visualized   NECK: no adenopathy, no asymmetry, masses, or scars and thyroid normal to palpation  RESP: lungs clear to auscultation - no rales, rhonchi or wheezes  CV: regular rate and rhythm, normal S1 S2, no S3 or S4, no murmur, click or rub, no peripheral edema and peripheral pulses strong  MS: no gross musculoskeletal defects noted, no edema          "

## 2023-12-11 NOTE — PATIENT INSTRUCTIONS
Patient Education   Here is the plan from today's visit    1. Gastroesophageal reflux disease without esophagitis  Taper the pantoprazole as you can -- every other day and decrase from there     For throat/sinuses: nasal saline rinse followed by a fluticasone nasal spray         Please call or return to clinic if your symptoms don't go away.    Follow up plan  Return in about 2 months (around 2/11/2024) for Follow up, with me, for preventive complete physical visit.    Thank you for coming to [facility] today.  Lab Testing:  **If you had lab testing today and your results are reassuring or normal they will be mailed to you or sent through [employer] within 7 days.   **If the lab tests need quick action we will call you with the results.  **If you are having labs done on a different day, please call [phone] to schedule at [facility] or [phone] for other [facility] locations. Labs do not offer walk-in appointments.  The phone number we will call with results is # [phone] (home) . If this is not the best number please call our clinic and change the number.  Medication Refills:  If you need any refills please call your pharmacy and they will contact us.   If you need to  your refill at a new pharmacy, please contact the new pharmacy directly. The new pharmacy will help you get your medications transferred faster.   Scheduling:  If you have any concerns about today's visit or wish to schedule another appointment please call our office during normal business hours [phone] (8-5:00 M-F). If you can no longer make a scheduled visit, please cancel via [employer] or call us to cancel.   If a referral was made to an [facility] specialty provider and you do not get a call from central scheduling, please refer to directions on your visit summary or call our office during normal business hours for assistance.   If a Mammogram was ordered for you at the Breast Center call  [phone] to schedule or change your appointment.  If you had an XRay/CT/Ultrasound/MRI ordered the number is [phone] to schedule or change your radiology appointment.   [facility] has limited ultrasound appointments available on Wednesdays, if you would like your ultrasound at [facility], please call [phone] to schedule.   Medical Concerns:  If you have urgent medical concerns please call [phone] at any time of the day.    Linh Blair, DO

## 2023-12-12 PROCEDURE — 90471 IMMUNIZATION ADMIN: CPT | Performed by: STUDENT IN AN ORGANIZED HEALTH CARE EDUCATION/TRAINING PROGRAM

## 2023-12-12 PROCEDURE — 90746 HEPB VACCINE 3 DOSE ADULT IM: CPT | Performed by: STUDENT IN AN ORGANIZED HEALTH CARE EDUCATION/TRAINING PROGRAM

## 2024-01-25 ENCOUNTER — OFFICE VISIT (OUTPATIENT)
Dept: FAMILY MEDICINE | Facility: CLINIC | Age: 29
End: 2024-01-25
Payer: COMMERCIAL

## 2024-01-25 VITALS
HEIGHT: 66 IN | DIASTOLIC BLOOD PRESSURE: 71 MMHG | HEART RATE: 65 BPM | OXYGEN SATURATION: 96 % | RESPIRATION RATE: 18 BRPM | BODY MASS INDEX: 35.51 KG/M2 | SYSTOLIC BLOOD PRESSURE: 104 MMHG | TEMPERATURE: 98.3 F

## 2024-01-25 DIAGNOSIS — B96.89 ACUTE BACTERIAL SINUSITIS: ICD-10-CM

## 2024-01-25 DIAGNOSIS — J01.90 ACUTE BACTERIAL SINUSITIS: ICD-10-CM

## 2024-01-25 DIAGNOSIS — M79.672 BILATERAL FOOT PAIN: ICD-10-CM

## 2024-01-25 DIAGNOSIS — Z12.4 CERVICAL CANCER SCREENING: Primary | ICD-10-CM

## 2024-01-25 DIAGNOSIS — K21.9 GASTROESOPHAGEAL REFLUX DISEASE WITHOUT ESOPHAGITIS: ICD-10-CM

## 2024-01-25 DIAGNOSIS — M79.671 BILATERAL FOOT PAIN: ICD-10-CM

## 2024-01-25 PROCEDURE — 99214 OFFICE O/P EST MOD 30 MIN: CPT | Mod: 25 | Performed by: STUDENT IN AN ORGANIZED HEALTH CARE EDUCATION/TRAINING PROGRAM

## 2024-01-25 PROCEDURE — 87624 HPV HI-RISK TYP POOLED RSLT: CPT | Performed by: STUDENT IN AN ORGANIZED HEALTH CARE EDUCATION/TRAINING PROGRAM

## 2024-01-25 PROCEDURE — 90471 IMMUNIZATION ADMIN: CPT | Performed by: STUDENT IN AN ORGANIZED HEALTH CARE EDUCATION/TRAINING PROGRAM

## 2024-01-25 PROCEDURE — 90746 HEPB VACCINE 3 DOSE ADULT IM: CPT | Performed by: STUDENT IN AN ORGANIZED HEALTH CARE EDUCATION/TRAINING PROGRAM

## 2024-01-25 RX ORDER — AMOXICILLIN 875 MG
875 TABLET ORAL 2 TIMES DAILY
Qty: 14 TABLET | Refills: 0 | Status: SHIPPED | OUTPATIENT
Start: 2024-01-25 | End: 2024-02-01

## 2024-01-25 RX ORDER — FAMOTIDINE 20 MG/1
20 TABLET, FILM COATED ORAL 2 TIMES DAILY PRN
Qty: 60 TABLET | Refills: 3 | Status: SHIPPED | OUTPATIENT
Start: 2024-01-25 | End: 2024-07-12

## 2024-01-25 NOTE — PROGRESS NOTES
"  Assessment & Plan     Cervical cancer screening  - High risk HPV vaginal self-swab    Acute bacterial sinusitis  >10 days of sinus pain with nasal drainage in the setting of chronic post-nasal drip and congestion. Has had 2 previous sinus infections treated with abx in past year. Recommend 7 day course of amoxicillin.   - amoxicillin (AMOXIL) 875 MG tablet; Take 1 tablet (875 mg) by mouth 2 times daily for 7 days    Gastroesophageal reflux disease without esophagitis  Currently on 40mg daily of pantoprazole for GERD with no major breakthrough symptoms. Will plan to trial pantoprazole every other day. Recommend famotidine PRN while tapering.   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 Has had more recent onset of lateral foot pain with starting new job. Could benefit from orthopedic referral for evaluation and possible orthotics.   - Orthopedic  Referral; Future    Ordering of each unique test  Prescription drug management  18 minutes spent by me on the date of the encounter doing chart review, history and exam, documentation and further activities per the note      BMI  Estimated body mass index is 35.51 kg/m  as calculated from the following:    Height as of this encounter: 1.676 m (5' 6\").    Weight as of 12/11/23: 99.8 kg (220 lb).           Return if symptoms worsen or fail to improve.    Subjective   Kassandra is a 28 year old, presenting for the following health issues:  Gyn Exam and Sinus Problem (Possible sinus infection, respiratory issues have gotten worse)    HPI     Cervical cancer screening-  - Wants to do self swab    Concern for sinus infection-  - Has had 2 sinus infections that were treated with abx in past year  - Since October, has been losing voice, ongoing " "congestion/drainage  - Had COVID booster in November  - 2 weeks ago lost sense of smell   - Has been doing barrett pots and nasal spray, hasn't made a huge difference  - for past 10-14 days has had bad headache, ears popping, sinus pain, green drainage  - Concern for yeast infections with abx    Review PPI use-   - Has been taking pantoprazole 40mg every day  - Open to trialing every other day  - Hasn't had severe breakthrough reflux, just some burping      ROS: 10 point ROS neg other than the symptoms noted above in the HPI.        Objective    /71   Pulse 65   Temp 98.3  F (36.8  C) (Oral)   Resp 18   Ht 1.676 m (5' 6\")   SpO2 96%   BMI 35.51 kg/m    Body mass index is 35.51 kg/m .  Physical Exam   GENERAL: Healthy, alert and no distress  EYES: Eyes grossly normal to inspection.  No discharge or erythema, or obvious scleral/conjunctival abnormalities.  HEENT: bilateral maxillary and frontal sinus tenderness. Tms normal. Posterior oropharynx normal.   RESP: No audible wheeze, cough, or visible cyanosis.  No visible retractions or increased work of breathing.    SKIN: Visible skin clear. No significant rash, abnormal pigmentation or lesions.  NEURO: Alert and oriented. Mentation and speech appropriate for age.  PSYCH: Dressed appropriately for weather and occasion. Behavior cooperative. Speech is regular in rate, volume, and prosody. Affect is bright.            Suzie Lawler, MS3  [employer]    I was present with the medical student who participated in the service and in the documentation of this note. I have verified the history and personally performed the physical exam and medical decision making, and have verified the content of the note, which accurately reflects my assessment of the patient and the plan of care.   Linh Blair DO     Signed Electronically by: Linh Blair DO    "

## 2024-01-29 LAB
HUMAN PAPILLOMA VIRUS 16 DNA: NEGATIVE
HUMAN PAPILLOMA VIRUS 18 DNA: NEGATIVE
HUMAN PAPILLOMA VIRUS FINAL DIAGNOSIS: NORMAL
HUMAN PAPILLOMA VIRUS OTHER HR: NEGATIVE

## 2024-02-04 NOTE — TELEPHONE ENCOUNTER
DIAGNOSIS: BILATERAL foot pain, see referral note / Linh Blair DO in [facility] / [facility] / No imaging done     APPOINTMENT DATE: 2.5.24   NOTES STATUS DETAILS   OFFICE NOTE from referring provider Internal 1.25.24  Johnnie  FP   MEDICATION LIST Internal

## 2024-02-05 ENCOUNTER — OFFICE VISIT (OUTPATIENT)
Dept: ORTHOPEDICS | Facility: CLINIC | Age: 29
End: 2024-02-05
Payer: COMMERCIAL

## 2024-02-05 ENCOUNTER — ANCILLARY PROCEDURE (OUTPATIENT)
Dept: GENERAL RADIOLOGY | Facility: CLINIC | Age: 29
End: 2024-02-05
Attending: STUDENT IN AN ORGANIZED HEALTH CARE EDUCATION/TRAINING PROGRAM
Payer: COMMERCIAL

## 2024-02-05 ENCOUNTER — PRE VISIT (OUTPATIENT)
Dept: ORTHOPEDICS | Facility: CLINIC | Age: 29
End: 2024-02-05

## 2024-02-05 DIAGNOSIS — M79.672 BILATERAL FOOT PAIN: ICD-10-CM

## 2024-02-05 DIAGNOSIS — M79.671 BILATERAL FOOT PAIN: ICD-10-CM

## 2024-02-05 PROCEDURE — 99203 OFFICE O/P NEW LOW 30 MIN: CPT | Performed by: STUDENT IN AN ORGANIZED HEALTH CARE EDUCATION/TRAINING PROGRAM

## 2024-02-05 PROCEDURE — 73630 X-RAY EXAM OF FOOT: CPT | Mod: RT | Performed by: RADIOLOGY

## 2024-02-05 NOTE — PROGRESS NOTES
[facility]  Sports Medicine Clinic  Clinics and Surgery Center           SUBJECTIVE       Kassandra Arcos is a 28 year old adult presenting to clinic today with b/l foot pain. Today, the patient reports that she's had bilateral foot pain for 1.5-2 years, but states she's had chronic pain of other joints and areas as well. 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 Left is worse than the right.      Background:   Occupation: teacher at pre-school   Hand Dominance (If pertinent): NA    Injury (Y/N): None  Work Comp (Y/N): No  Date of injury: None  Mechanism of Injury: No injury    Duration of symptoms: 1.5-2 years.    Intensity (1-10): 8/10   Aggravating factors: crossing legs on the floor   Relieving Factors: Feet flat on the floor    Prior Evaluation: No   Previous Surgery on the area (Y/N): No   Physical Therapy (Previous/Current/None): None    Physical Activity/Exercise (What, How Often): No       PMH, Medications and Allergies were reviewed and updated as needed.    ROS:  As noted above otherwise negative.    Patient Active Problem List   Diagnosis    Bipolar I disorder with bryan (H)    Allergic rhinitis due to pollen    Chronic bilateral low back pain    Generalized anxiety disorder    Hypermobility arthralgia    Recurrent major depressive disorder (H24)       Current Outpatient Medications   Medication Sig Dispense Refill    famotidine (PEPCID) 20 MG tablet Take 1 tablet (20 mg) by mouth 2 times daily as needed (reflux) 60 tablet 3    lamoTRIgine (LAMICTAL) 200 MG tablet Take 1 tablet (200 mg) by mouth daily x 14 days, then take 2 tablets (50 mg) by mouth daily      ondansetron (ZOFRAN ODT) 4 MG ODT tab Take 1 tablet (4 mg) by mouth every 8 hours as needed for nausea 21 tablet 0    pantoprazole (PROTONIX) 40 MG EC tablet TAKE 1 TABLET BY MOUTH EVERY DAY 90 tablet 3     propranolol ER (INDERAL LA) 60 MG 24 hr capsule Take 2 capsules (120 mg) by mouth daily      QUEtiapine (SEROQUEL) 50 MG tablet Take 1 tablet (50 mg) by mouth at bedtime      vitamin B-Complex Take 1 tablet by mouth daily              OBJECTIVE:       Vitals: There were no vitals filed for this visit.  BMI: There is no height or weight on file to calculate BMI.    Gen:  Well nourished and in no acute distress  HEENT: Extraocular movement intact  Neck: Supple  Pulm:  Breathing Comfortably. No increased respiratory effort.  Psych: Euthymic. Appropriately answers questions    MSK: Bilateral feet without evidence of erythema, ecchymosis, or edema.  No evidence of pes planus or hallux valgus.  Normal alignment of the foot, however there is evidence of slight hypersupination, leading to the lateral weightbearing onto the fifth metatarsal head and MTP joints.  Tenderness to palpation in the fifth MTP joint bilaterally.  No tendon pain with palpation.  Full range of motion of the bilateral ankles without crepitus.  Negative tib-fib squeeze, calcaneal squeeze, anterior drawer, talar tilt, and external rotation.  Negative midfoot rotation.  Negative first MTP compression.  Nonantalgic gait.      XRAY : Independent evaluation of the bilateral feet is without evidence of acute osseous abnormalities.  No significant degenerative changes seen.          ASSESSMENT and PLAN:     Kassandra was seen today for pain and pain.    Diagnoses and all orders for this visit:    Bilateral foot pain  -     Orthopedic [facility] Referral  -     X-ray bl foot 3+ vw; Future      28-year-old female, with a past medical history of chronic pain, presenting to clinic at the recommendation of her PCP for bilateral foot pain.  The patient does have a history of clubfoot, mild as a child.  This did self-correct without surgical intervention.  Patient does have pain on the lateral sides of her feet, worse with crossing her legs over, without radicular  symptoms.  She does have tenderness to palpation on the lateral aspect of the MTP joint.  This however is not very reproducible with physical exam.  Her x-rays were obtained and overall very reassuring.  At this point, I do think the patient's pain appears to be more functional in that area, given some of the hypersupination of the foot, this can lead to some mild pain in the area that she is discussing.  Because of this, we have discussed options for management including an MRI versus conservative treatment.  The patient would like to proceed with conservative treatment.  I do think she benefit from custom orthotics, therefore I have placed a podiatry referral for her.  We have also provided her with metatarsal pads that she can use in her shoes for assistance on a daily basis given the fact that she is a teacher.  Would expect the patient to follow-up with clinical podiatry for gait analysis and orthotics.  She can follow-up in our clinic as needed.  Return precautions have been advised.    Options for treatment and/or follow-up care were reviewed with the patient was actively involved in the decision making process. Patient verbalized understanding and was in agreement with the plan.    Masoud Kebede DO  , Sports Medicine  Department of Family Medicine and [facility]

## 2024-02-05 NOTE — LETTER
2/5/2024      RE: Kassandra Arcos  [address]  [facility] [address]     Dear Colleague,    Thank you for referring your patient, Kassandra Arcos, to the [facility] [place]. Please see a copy of my visit note below.    [facility]  Sports Medicine Clinic  Clin[facility] and [facility] Center           SUBJECTIVE       Kassandra Arcos is a 28 year old adult presenting to clinic today with b/l foot pain. Today, the patient reports that she's had bilateral foot pain for 1.5-2 years, but states she's had chronic pain of other joints and areas as well. 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 Left is worse than the right.      Background:   Occupation: teacher at pre-school   Hand Dominance (If pertinent): NA    Injury (Y/N): None  Work Comp (Y/N): No  Date of injury: None  Mechanism of Injury: No injury    Duration of symptoms: 1.5-2 years.    Intensity (1-10): 8/10   Aggravating factors: crossing legs on the floor   Relieving Factors: Feet flat on the floor    Prior Evaluation: No   Previous Surgery on the area (Y/N): No   Physical Therapy (Previous/Current/None): None    Physical Activity/Exercise (What, How Often): No       PMH, Medications and Allergies were reviewed and updated as needed.    ROS:  As noted above otherwise negative.    Patient Active Problem List   Diagnosis     Bipolar I disorder with bryan (H)     Allergic rhinitis due to pollen     Chronic bilateral low back pain     Generalized anxiety disorder     Hypermobility arthralgia     Recurrent major depressive disorder (H24)       Current Outpatient Medications   Medication Sig Dispense Refill     famotidine (PEPCID) 20 MG tablet Take 1 tablet (20 mg) by mouth 2 times daily as needed (reflux) 60 tablet 3     lamoTRIgine (LAMICTAL) 200 MG tablet Take 1 tablet (200 mg) by  mouth daily x 14 days, then take 2 tablets (50 mg) by mouth daily       ondansetron (ZOFRAN ODT) 4 MG ODT tab Take 1 tablet (4 mg) by mouth every 8 hours as needed for nausea 21 tablet 0     pantoprazole (PROTONIX) 40 MG EC tablet TAKE 1 TABLET BY MOUTH EVERY DAY 90 tablet 3     propranolol ER (INDERAL LA) 60 MG 24 hr capsule Take 2 capsules (120 mg) by mouth daily       QUEtiapine (SEROQUEL) 50 MG tablet Take 1 tablet (50 mg) by mouth at bedtime       vitamin B-Complex Take 1 tablet by mouth daily              OBJECTIVE:       Vitals: There were no vitals filed for this visit.  BMI: There is no height or weight on file to calculate BMI.    Gen:  Well nourished and in no acute distress  HEENT: Extraocular movement intact  Neck: Supple  Pulm:  Breathing Comfortably. No increased respiratory effort.  Psych: Euthymic. Appropriately answers questions    MSK: Bilateral feet without evidence of erythema, ecchymosis, or edema.  No evidence of pes planus or hallux valgus.  Normal alignment of the foot, however there is evidence of slight hypersupination, leading to the lateral weightbearing onto the fifth metatarsal head and MTP joints.  Tenderness to palpation in the fifth MTP joint bilaterally.  No tendon pain with palpation.  Full range of motion of the bilateral ankles without crepitus.  Negative tib-fib squeeze, calcaneal squeeze, anterior drawer, talar tilt, and external rotation.  Negative midfoot rotation.  Negative first MTP compression.  Nonantalgic gait.      XRAY : Independent evaluation of the bilateral feet is without evidence of acute osseous abnormalities.  No significant degenerative changes seen.          ASSESSMENT and PLAN:     Kassandra was seen today for pain and pain.    Diagnoses and all orders for this visit:    Bilateral foot pain  -     Orthopedic [facility] Referral  -     X-ray bl foot 3+ vw; Future      28-year-old female, with a past medical history of chronic pain, presenting to clinic at the  recommendation of her PCP for bilateral foot pain.  The patient does have a history of clubfoot, mild as a child.  This did self-correct without surgical intervention.  Patient does have pain on the lateral sides of her feet, worse with crossing her legs over, without radicular symptoms.  She does have tenderness to palpation on the lateral aspect of the MTP joint.  This however is not very reproducible with physical exam.  Her x-rays were obtained and overall very reassuring.  At this point, I do think the patient's pain appears to be more functional in that area, given some of the hypersupination of the foot, this can lead to some mild pain in the area that she is discussing.  Because of this, we have discussed options for management including an MRI versus conservative treatment.  The patient would like to proceed with conservative treatment.  I do think she benefit from custom orthotics, therefore I have placed a podiatry referral for her.  We have also provided her with metatarsal pads that she can use in her shoes for assistance on a daily basis given the fact that she is a teacher.  Would expect the patient to follow-up with clinical podiatry for gait analysis and orthotics.  She can follow-up in our clinic as needed.  Return precautions have been advised.    Options for treatment and/or follow-up care were reviewed with the patient was actively involved in the decision making process. Patient verbalized understanding and was in agreement with the plan.    Masoud Kebede DO  , Sports Medicine  Department of Family Medicine and [facility]

## 2024-07-12 DIAGNOSIS — K21.9 GASTROESOPHAGEAL REFLUX DISEASE WITHOUT ESOPHAGITIS: ICD-10-CM

## 2024-07-12 RX ORDER — FAMOTIDINE 20 MG/1
20 TABLET, FILM COATED ORAL 2 TIMES DAILY PRN
Qty: 180 TABLET | Refills: 1 | Status: SHIPPED | OUTPATIENT
Start: 2024-07-12

## 2024-07-12 NOTE — TELEPHONE ENCOUNTER
"Request for medication refill:  famotidine (PEPCID) 20 MG tablet     Providers if patient needs an appointment and you are willing to give a one month supply please refill for one month and  send a letter/MyChart using \".SMILLIMITEDREFILL\" .smillimited and route chart to \"P [facility] \" (Giving one month refill in non controlled medications is strongly recommended before denial)    If refill has been denied, meaning absolutely no refills without visit, please complete the smart phrase \".smirxrefuse\" and route it to the \"P [facility] MED REFILLS\"  pool to inform the patient and the pharmacy.    Fransisca Arias, [facility]      "

## 2024-07-14 ENCOUNTER — HEALTH MAINTENANCE LETTER (OUTPATIENT)
Age: 29
End: 2024-07-14

## 2024-09-11 ENCOUNTER — PATIENT OUTREACH (OUTPATIENT)
Dept: FAMILY MEDICINE | Facility: CLINIC | Age: 29
End: 2024-09-11
Payer: COMMERCIAL

## 2024-09-11 NOTE — TELEPHONE ENCOUNTER
Patient Quality Outreach    Patient is due for the following:   Cervical Cancer Screening - PAP Needed- Pt is up to date & due 1/2029    Next Steps:   No follow up needed at this time.    Type of outreach:    Chart review performed, no outreach needed.    Next Steps:  none    Questions for provider review:    None           Sulma Gupta RN

## 2024-11-30 DIAGNOSIS — R11.2 NAUSEA AND VOMITING, UNSPECIFIED VOMITING TYPE: ICD-10-CM

## 2024-12-02 RX ORDER — PANTOPRAZOLE SODIUM 40 MG/1
40 TABLET, DELAYED RELEASE ORAL DAILY
Qty: 90 TABLET | Refills: 0 | Status: SHIPPED | OUTPATIENT
Start: 2024-12-02

## 2024-12-02 NOTE — TELEPHONE ENCOUNTER
"Request for medication refill:    pantoprazole (PROTONIX) 40 MG EC tablet    Providers if patient needs an appointment and you are willing to give a one month supply please refill for one month and  send a letter/MyChart using \".SMILLIMITEDREFILL\" .smillimited and route chart to \"P [facility] \" (Giving one month refill in non controlled medications is strongly recommended before denial)    If refill has been denied, meaning absolutely no refills without visit, please complete the smart phrase \".smirxrefuse\" and route it to the \"P [facility] MED REFILLS\"  pool to inform the patient and the pharmacy.    Lory Nunez MA      "

## 2025-07-19 ENCOUNTER — HEALTH MAINTENANCE LETTER (OUTPATIENT)
Age: 30
End: 2025-07-19